# Patient Record
Sex: MALE | NOT HISPANIC OR LATINO | Employment: FULL TIME | URBAN - METROPOLITAN AREA
[De-identification: names, ages, dates, MRNs, and addresses within clinical notes are randomized per-mention and may not be internally consistent; named-entity substitution may affect disease eponyms.]

---

## 2018-05-10 ENCOUNTER — OFFICE VISIT (OUTPATIENT)
Dept: FAMILY MEDICINE | Facility: CLINIC | Age: 33
End: 2018-05-10
Payer: COMMERCIAL

## 2018-05-10 VITALS
SYSTOLIC BLOOD PRESSURE: 120 MMHG | TEMPERATURE: 98.2 F | HEART RATE: 72 BPM | WEIGHT: 181 LBS | DIASTOLIC BLOOD PRESSURE: 74 MMHG | HEIGHT: 68 IN | BODY MASS INDEX: 27.43 KG/M2

## 2018-05-10 DIAGNOSIS — H10.13 ALLERGIC CONJUNCTIVITIS, BILATERAL: ICD-10-CM

## 2018-05-10 DIAGNOSIS — Z00.00 ROUTINE GENERAL MEDICAL EXAMINATION AT A HEALTH CARE FACILITY: Primary | ICD-10-CM

## 2018-05-10 PROCEDURE — 36415 COLL VENOUS BLD VENIPUNCTURE: CPT | Performed by: FAMILY MEDICINE

## 2018-05-10 PROCEDURE — 80061 LIPID PANEL: CPT | Performed by: FAMILY MEDICINE

## 2018-05-10 PROCEDURE — 99385 PREV VISIT NEW AGE 18-39: CPT | Performed by: FAMILY MEDICINE

## 2018-05-10 PROCEDURE — 82947 ASSAY GLUCOSE BLOOD QUANT: CPT | Performed by: FAMILY MEDICINE

## 2018-05-10 NOTE — PROGRESS NOTES
SUBJECTIVE:   CC: George Morgan is an 33 year old male who presents for preventative health visit.     Healthy Habits:    Do you get at least three servings of calcium containing foods daily (dairy, green leafy vegetables, etc.)? yes    Amount of exercise or daily activities, outside of work: not as much    Problems taking medications regularly No    Medication side effects: No    Have you had an eye exam in the past two years? no    Do you see a dentist twice per year? no    Do you have sleep apnea, excessive snoring or daytime drowsiness?no       * check cholesterol and glucose levels  * itchy eyes     Seasonal itching to the eyes with some clear drainage and mild sneezing and runny nose.  Takes zyrtec and finds that helpful.    Was told he had high cholesterol in the past and made some diet changes which seemed to help.    No other concerns were noted at this time.    [place] and thinks he had a tetanus shot at that time.    Today's PHQ-2 Score:   PHQ-2 ( 1999 Pfizer) 5/10/2018   Q1: Little interest or pleasure in doing things 0   Q2: Feeling down, depressed or hopeless 0   PHQ-2 Score 0       Abuse: Current or Past(Physical, Sexual or Emotional)- No  Do you feel safe in your environment - Yes    Social History   Substance Use Topics     Smoking status: Never Smoker     Smokeless tobacco: Never Used     Alcohol use No      If you drink alcohol do you typically have >3 drinks per day or >7 drinks per week? No                      Last PSA: No results found for: PSA    Reviewed orders with patient. Reviewed health maintenance and updated orders accordingly - Yes  There is no problem list on file for this patient.    Past Surgical History:   Procedure Laterality Date     NO HISTORY OF SURGERY         Social History   Substance Use Topics     Smoking status: Never Smoker     Smokeless tobacco: Never Used     Alcohol use No     Family History   Problem Relation Age of Onset  "    Arthritis Mother      Other - See Comments Father      pt was very young, possible complications of diabetes     DIABETES No family hx of      Hypertension No family hx of          Current Outpatient Prescriptions   Medication Sig Dispense Refill     cetirizine HCl (ZYRTEC ALLERGY) 10 MG CAPS        No Known Allergies    Reviewed and updated as needed this visit by clinical staff  Tobacco  Allergies  Meds  Problems  Med Hx  Surg Hx  Fam Hx  Soc Hx          Reviewed and updated as needed this visit by Provider  Tobacco  Allergies  Meds  Problems  Med Hx  Surg Hx  Fam Hx  Soc Hx         History reviewed. No pertinent past medical history.   Past Surgical History:   Procedure Laterality Date     NO HISTORY OF SURGERY         ROS:  C: NEGATIVE for fever, chills, change in weight  I: NEGATIVE for worrisome rashes, moles or lesions  E: NEGATIVE for vision changes or irritation  ENT: NEGATIVE for ear, mouth and throat problems  R: NEGATIVE for significant cough or SOB  CV: NEGATIVE for chest pain, palpitations or peripheral edema  GI: NEGATIVE for nausea, abdominal pain, heartburn, or change in bowel habits   male: negative for dysuria, hematuria, decreased urinary stream, erectile dysfunction, urethral discharge  M: NEGATIVE for significant arthralgias or myalgia  N: NEGATIVE for weakness, dizziness or paresthesias  E: NEGATIVE for temperature intolerance, skin/hair changes  H: NEGATIVE for bleeding problems  P: NEGATIVE for changes in mood or affect    OBJECTIVE:   /74  Pulse 72  Temp 98.2  F (36.8  C) (Oral)  Ht 5' 8\" (1.727 m)  Wt 181 lb (82.1 kg)  BMI 27.52 kg/m2  EXAM:  GENERAL: healthy, alert and no distress  EYES: Eyes grossly normal to inspection, PERRL and conjunctivae and sclerae normal  EYES: Eyes grossly normal to inspection and mild injection and cobblestoning  HENT: ear canals and TM's normal, nose and mouth without ulcers or lesions  NECK: no adenopathy, no asymmetry, masses, " "or scars and thyroid normal to palpation  RESP: lungs clear to auscultation - no rales, rhonchi or wheezes  CV: regular rate and rhythm, normal S1 S2, no S3 or S4, no murmur, click or rub, no peripheral edema and peripheral pulses strong  ABDOMEN: soft, nontender, no hepatosplenomegaly, no masses and bowel sounds normal   (male): normal male genitalia without lesions or urethral discharge, no hernia  MS: no gross musculoskeletal defects noted, no edema  SKIN: no suspicious lesions or rashes  NEURO: Normal strength and tone, mentation intact and speech normal  PSYCH: mentation appears normal, affect normal/bright    ASSESSMENT/PLAN:   1. Routine general medical examination at a health care facility  Routine health issues were reviewed for age.  Recheck 1-3 years.  - Lipid panel reflex to direct LDL Non-fasting  - Glucose    2. Allergic conjunctivitis, bilateral  Symptom treatment with otc meds and indications for follow up were reviewed.      COUNSELING:  Reviewed preventive health counseling, as reflected in patient instructions       Regular exercise       Healthy diet/nutrition       Vision screening       Hearing screening       Immunizations    Declined: TDAP due to Other thinks he has had within the last 10 years               Family planning       Safe sex practices/STD prevention    BP Screening:   Last 3 BP Readings:    BP Readings from Last 3 Encounters:   05/10/18 120/74       The following was recommended to the patient:  Re-screen BP within a year and recommended lifestyle modifications   reports that he has never smoked. He has never used smokeless tobacco.    Estimated body mass index is 27.52 kg/(m^2) as calculated from the following:    Height as of this encounter: 5' 8\" (1.727 m).    Weight as of this encounter: 181 lb (82.1 kg).   Weight management plan: Discussed healthy diet and exercise guidelines and patient will follow up in 12 months in clinic to re-evaluate.    Counseling Resources:  ATP " IV Guidelines  Pooled Cohorts Equation Calculator  FRAX Risk Assessment  ICSI Preventive Guidelines  Dietary Guidelines for Americans, 2010  USDA's MyPlate  ASA Prophylaxis  Lung CA Screening    Ra Cardenas MD  [facility]

## 2018-05-10 NOTE — PATIENT INSTRUCTIONS
Preventive Health Recommendations  Male Ages 26 - 39    Yearly exam:             See your health care provider every year in order to  o   Review health changes.   o   Discuss preventive care.    o   Review your medicines if your doctor has prescribed any.    You should be tested each year for STDs (sexually transmitted diseases), if you re at risk.     After age 35, talk to your provider about cholesterol testing. If you are at risk for heart disease, have your cholesterol tested at least every 5 years.     If you are at risk for diabetes, you should have a diabetes test (fasting glucose).  Shots: Get a flu shot each year. Get a tetanus shot every 10 years.     Nutrition:    Eat at least 5 servings of fruits and vegetables daily.     Eat whole-grain bread, whole-wheat pasta and brown rice instead of white grains and rice.     Talk to your provider about Calcium and Vitamin D.     Lifestyle    Exercise for at least 150 minutes a week (30 minutes a day, 5 days a week). This will help you control your weight and prevent disease.     Limit alcohol to one drink per day.     No smoking.     Wear sunscreen to prevent skin cancer.     See your dentist every six months for an exam and cleaning.   [facility]   Discharged by : eric  Paper scripts provided to patient : none     If you have any questions regarding your visit please contact your care team:     Team Gold                Clinic Hours Telephone Number     Dr. Angelica Lima, NP 7am-7pm  Monday - Thursday   7am-5pm  Fridays  [phone]   (Appointment scheduling available 24/7)     RN Line  [phone] option 2     Urgent Care - [place] - 11am-9pm Monday-Friday Saturday-Sunday- 9am-5pm     [place] -   5pm-9pm Monday-Friday Saturday-Sunday- 9am-5pm    [phone] - Nela Fernandez    [phone] - Pako fuentes  Price Quote for your services, please call our Consumer Price Line at [phone].     What options do I have for visits at the clinic other than the traditional office visit?     To expand how we care for you, many of our providers are utilizing electronic visits (e-visits) and telephone visits, when medically appropriate, for interactions with their patients rather than a visit in the clinic. We also offer nurse visits for many medical concerns. Just like any other service, we will bill your insurance company for this type of visit based on time spent on the phone with your provider. Not all insurance companies cover these visits. Please check with your medical insurance if this type of visit is covered. You will be responsible for any charges that are not paid by your insurance.   E-visits via [employer]: generally incur a $35.00 fee.     Telephone visits:  Time spent on the phone: *charged based on time that is spent on the phone in increments of 10 minutes. Estimated cost:   5-10 mins $30.00   11-20 mins. $59.00   21-30 mins. $85.00       Use [employer] (secure email communication and access to your chart) to send your primary care provider a message or make an appointment. Ask someone on your Team how to sign up for [employer].     As always, Thank you for trusting us with your health care needs!      [facility]:    Scheduling Appointments  [place] [place]  Call: [phone]    [facility]  Call: [phone]    [facility]  Call: [phone]    For Gastroenterology referrals   [facility]   Clinics and Surgery Center, 4th Floor   [address] [place] [address]   Appointments: [phone]    WHERE TO GO FOR CARE?  Clinic    Make an appointment if you:       Are sick (cold, cough, flu, sore throat, earache or in pain).       Have a small injury (sprain, small cut, burn or broken bone).       Need a  physical exam, Pap smear, vaccine or prescription refill.       Have questions about your health or medicines.    To reach us:      Call [phone] ([phone]). Open 24 hours every day. (For counseling services, call [phone].)    Log into [employer] at [email or web address]. (Visit [email or web address] to create an account.) Hospital emergency room    An emergency is a serious or life- threatening problem that must be treated right away.    Call 911 or get to the hospital if you have:      Very bad or sudden:            - Chest pain or pressure         - Bleeding         - Head or belly pain         - Dizziness or trouble seeing, walking or                          Speaking      Problems breathing      Blood in your vomit or you are coughing up blood      A major injury (knocked out, loss of a finger or limb, rape, broken bone protruding from skin)    A mental health crisis. (Or call the Mental Health Crisis line at 1-881.358.3305 or Suicide Prevention Hotline at 1-576.563.1211.)    Open 24 hours every day. You don't need an appointment.     Urgent care    Visit urgent care for sickness or small injuries when the clinic is closed. You don't need an appointment. To check hours or find an urgent care near you, visit [email or web address]. Online care    Get online care from [facility] for more than 70 common problems, like colds, allergies and infections. Open 24 hours every day at:   [email or web address]   Need help deciding?    For advice about where to be seen, you may call your clinic and ask to speak with a nurse. We're here for you 24 hours every day.         If you are deaf or hard of hearing, please let us know. We provide many free services including sign language interpreters, oral interpreters, TTYs, telephone amplifiers, note takers and written materials.

## 2018-05-10 NOTE — MR AVS SNAPSHOT
After Visit Summary   5/10/2018    George Morgan    MRN: [id number]           Patient Information     Date Of Birth          1985        Visit Information        Provider Department      5/10/2018 6:20 PM Ra Cardenas MD [facility]        Today's Diagnoses     Routine general medical examination at a health care facility    -  1    Need for prophylactic vaccination with tetanus-diphtheria (TD)          Care Instructions      Preventive Health Recommendations  Male Ages 26 - 39    Yearly exam:             See your health care provider every year in order to  o   Review health changes.   o   Discuss preventive care.    o   Review your medicines if your doctor has prescribed any.    You should be tested each year for STDs (sexually transmitted diseases), if you re at risk.     After age 35, talk to your provider about cholesterol testing. If you are at risk for heart disease, have your cholesterol tested at least every 5 years.     If you are at risk for diabetes, you should have a diabetes test (fasting glucose).  Shots: Get a flu shot each year. Get a tetanus shot every 10 years.     Nutrition:    Eat at least 5 servings of fruits and vegetables daily.     Eat whole-grain bread, whole-wheat pasta and brown rice instead of white grains and rice.     Talk to your provider about Calcium and Vitamin D.     Lifestyle    Exercise for at least 150 minutes a week (30 minutes a day, 5 days a week). This will help you control your weight and prevent disease.     Limit alcohol to one drink per day.     No smoking.     Wear sunscreen to prevent skin cancer.     See your dentist every six months for an exam and cleaning.   [facility]   Discharged by : eric  Paper scripts provided to patient : none     If you have any questions regarding your visit please contact your care team:     Team Gold                Clinic Hours Telephone Number     Dr. Dominguez  Uri Lima, NP 7am-7pm  Monday - Thursday   7am-5pm  Fridays  [phone]   (Appointment scheduling available 24/7)     RN Line  [phone] option 2     Urgent Care - [place] - 11am-9pm Monday-Friday Saturday-Sunday- 9am-5pm     [place] -   5pm-9pm Monday-Friday Saturday-Sunday- 9am-5pm    [phone] - [place]    [phone] - [place]       For a Price Quote for your services, please call our Consumer Price Line at [phone].     What options do I have for visits at the clinic other than the traditional office visit?     To expand how we care for you, many of our providers are utilizing electronic visits (e-visits) and telephone visits, when medically appropriate, for interactions with their patients rather than a visit in the clinic. We also offer nurse visits for many medical concerns. Just like any other service, we will bill your insurance company for this type of visit based on time spent on the phone with your provider. Not all insurance companies cover these visits. Please check with your medical insurance if this type of visit is covered. You will be responsible for any charges that are not paid by your insurance.   E-visits via [employer]: generally incur a $35.00 fee.     Telephone visits:  Time spent on the phone: *charged based on time that is spent on the phone in increments of 10 minutes. Estimated cost:   5-10 mins $30.00   11-20 mins. $59.00   21-30 mins. $85.00       Use [employer] (secure email communication and access to your chart) to send your primary care provider a message or make an appointment. Ask someone on your Team how to sign up for [employer].     As always, Thank you for trusting us with your health care needs!      [facility]:    Scheduling Appointments  [facility]  Call: [phone]    [facility]  Call: [phone]    [facility]  Call: [phone]    For Gastroenterology referrals   [facility]enterology   Clinics and Surgery Center, 4th Floor   [address] [place] [address]   Appointments: [phone]    WHERE TO GO FOR CARE?  Clinic    Make an appointment if you:       Are sick (cold, cough, flu, sore throat, earache or in pain).       Have a small injury (sprain, small cut, burn or broken bone).       Need a physical exam, Pap smear, vaccine or prescription refill.       Have questions about your health or medicines.    To reach us:      Call [phone] ([phone]). Open 24 hours every day. (For counseling services, call [phone].)    Log into [employer] at RightAnswers. (Visit [employer] to create an account.) Hospital emergency room    An emergency is a serious or life- threatening problem that must be treated right away.    Call [phone] or get to the hospital if you have:      Very bad or sudden:            - Chest pain or pressure         - Bleeding         - Head or belly pain         - Dizziness or trouble seeing, walking or                          Speaking      Problems breathing      Blood in your vomit or you are coughing up blood      A major injury (knocked out, loss of a finger or limb, rape, broken bone protruding from skin)    A mental health crisis. (Or call the Mental Health Crisis line at 1-748.584.5255 or Suicide Prevention Hotline at 1-216.199.4152.)    Open 24 hours every day. You don't need an appointment.     Urgent care    Visit urgent care for sickness or small injuries when the clinic is closed. You don't need an appointment. To check hours or find an urgent care near you, visit [email or web address]. Online care    Get online care from [facility] for more than 70 common problems, like colds, allergies and infections. Open 24 hours every day at:   [email or web address]   Need help deciding?    For advice about  "where to be seen, you may call your clinic and ask to speak with a nurse. We're here for you 24 hours every day.         If you are deaf or hard of hearing, please let us know. We provide many free services including sign language interpreters, oral interpreters, TTYs, telephone amplifiers, note takers and written materials.                   Follow-ups after your visit        Follow-up notes from your care team     Return in about 1 year (around 5/10/2019) for Physical Exam.      Who to contact     If you have questions or need follow up information about today's clinic visit or your schedule please contact [facility] directly at [phone].  Normal or non-critical lab and imaging results will be communicated to you by MyChart, letter or phone within 4 business days after the clinic has received the results. If you do not hear from us within 7 days, please contact the clinic through MyChart or phone. If you have a critical or abnormal lab result, we will notify you by phone as soon as possible.  Submit refill requests through [employer] or call your pharmacy and they will forward the refill request to us. Please allow 3 business days for your refill to be completed.          Additional Information About Your Visit        MyChart Information     [employer] lets you send messages to your doctor, view your test results, renew your prescriptions, schedule appointments and more. To sign up, go to [email or web address] . Click on \"Log in\" on the left side of the screen, which will take you to the Welcome page. Then click on \"Sign up Now\" on the right side of the page.     You will be asked to enter the access code listed below, as well as some personal information. Please follow the directions to create your username and password.     Your access code is: [email or web address]  Expires: 2018  6:55 PM     Your access code will  in 90 days. If you need help or a new code, please call your [place] clinic " "or [phone].        Care EveryWhere ID     This is your Care EveryWhere ID. This could be used by other organizations to access your [place] medical records  [id number]        Your Vitals Were     Pulse Temperature Height BMI (Body Mass Index)          72 98.2  F (36.8  C) (Oral) 5' 8\" (1.727 m) 27.52 kg/m2         Blood Pressure from Last 3 Encounters:   05/10/18 120/74    Weight from Last 3 Encounters:   05/10/18 181 lb (82.1 kg)              We Performed the Following     Glucose     Lipid panel reflex to direct LDL Non-fasting        Primary Care Provider Office Phone # Fax #    [facility] [phone]       [address] [facility] [address]        Equal Access to Services     ISI SEGAL : Hadii aad ku hadasho Soomaali, waaxda luqadaha, qaybta kaalmada adeegyada, sofía so hayperla thomason . So [facility] [phone].    ATENCIÓN: Si habla español, tiene a snider disposición servicios gratuitos de asistencia lingüística. Llame al [phone].    We comply with applicable federal civil rights laws and [place] laws. We do not discriminate on the basis of race, color, national origin, age, disability, sex, sexual orientation, or gender identity.            Thank you!     Thank you for choosing [facility]  for your care. Our goal is always to provide you with excellent care. Hearing back from our patients is one way we can continue to improve our services. Please take a few minutes to complete the written survey that you may receive in the mail after your visit with us. Thank you!             Your Updated Medication List - Protect others around you: Learn how to safely use, store and throw away your medicines at www.disposemymeds.org.          This list is accurate as of 5/10/18  6:55 PM.  Always use your most recent med list.                   Brand Name Dispense Instructions for use Diagnosis    ZYRTEC ALLERGY 10 MG Caps   Generic drug:  " cetirizine HCl

## 2018-05-10 NOTE — LETTER
[facility]  [address] [place] [id number] [phone]                                                                                                May 14, 2018    George Morgan  [address]    [place] [address]        Dear Mr. Morgan,      Overall tests look OK.  The mild elevation in the triglyceride level is likely due to being not fasting at the time of the labs.  No medication is needed at this time and we can recheck it next year.  Screening for diabetes was normal.    Results for orders placed or performed in visit on 05/10/18   Lipid panel reflex to direct LDL Non-fasting   Result Value Ref Range    Cholesterol 208 (H) <200 mg/dL    Triglycerides 292 (H) <150 mg/dL    HDL Cholesterol 37 (L) >39 mg/dL    LDL Cholesterol Calculated 113 (H) <100 mg/dL    Non HDL Cholesterol 171 (H) <130 mg/dL   Glucose   Result Value Ref Range    Glucose 79 70 - 99 mg/dL         Sincerely,      Ra Cardenas MD/mv

## 2018-05-11 LAB
CHOLEST SERPL-MCNC: 208 MG/DL
GLUCOSE SERPL-MCNC: 79 MG/DL (ref 70–99)
HDLC SERPL-MCNC: 37 MG/DL
LDLC SERPL CALC-MCNC: 113 MG/DL
NONHDLC SERPL-MCNC: 171 MG/DL
TRIGL SERPL-MCNC: 292 MG/DL

## 2019-12-31 ENCOUNTER — OFFICE VISIT (OUTPATIENT)
Dept: FAMILY MEDICINE | Facility: CLINIC | Age: 34
End: 2019-12-31
Payer: COMMERCIAL

## 2019-12-31 VITALS
WEIGHT: 184 LBS | HEIGHT: 69 IN | OXYGEN SATURATION: 98 % | SYSTOLIC BLOOD PRESSURE: 111 MMHG | DIASTOLIC BLOOD PRESSURE: 69 MMHG | HEART RATE: 84 BPM | BODY MASS INDEX: 27.25 KG/M2

## 2019-12-31 DIAGNOSIS — R07.82 INTERCOSTAL PAIN: ICD-10-CM

## 2019-12-31 DIAGNOSIS — Z00.00 ROUTINE GENERAL MEDICAL EXAMINATION AT A HEALTH CARE FACILITY: Primary | ICD-10-CM

## 2019-12-31 PROCEDURE — 36415 COLL VENOUS BLD VENIPUNCTURE: CPT | Performed by: FAMILY MEDICINE

## 2019-12-31 PROCEDURE — 82947 ASSAY GLUCOSE BLOOD QUANT: CPT | Performed by: FAMILY MEDICINE

## 2019-12-31 PROCEDURE — 80061 LIPID PANEL: CPT | Performed by: FAMILY MEDICINE

## 2019-12-31 PROCEDURE — 99395 PREV VISIT EST AGE 18-39: CPT | Performed by: FAMILY MEDICINE

## 2019-12-31 ASSESSMENT — ENCOUNTER SYMPTOMS
ABDOMINAL PAIN: 0
ARTHRALGIAS: 0
PARESTHESIAS: 0
HEMATOCHEZIA: 0
CONSTIPATION: 0
NERVOUS/ANXIOUS: 0
HEADACHES: 0
FEVER: 0
CHILLS: 0
NAUSEA: 0
DYSURIA: 0
PALPITATIONS: 0
DIZZINESS: 0
EYE PAIN: 0
MYALGIAS: 0
WEAKNESS: 0
SHORTNESS OF BREATH: 0
HEMATURIA: 0
DIARRHEA: 0
FREQUENCY: 0
COUGH: 1
JOINT SWELLING: 0
SORE THROAT: 0
HEARTBURN: 0

## 2019-12-31 ASSESSMENT — MIFFLIN-ST. JEOR: SCORE: 1770

## 2019-12-31 NOTE — LETTER
[facility]   [address] [place]  [address]  [phone]                                   January 3, 2020    George Ismail  [address] [place] [address]        Dear George,    Blood sugar and cholesterol levels are all stable compared to the last check.  Let me know if you have any questions.    Results for orders placed or performed in visit on 12/31/19   Lipid panel reflex to direct LDL Non-fasting     Status: Abnormal   Result Value Ref Range    Cholesterol 203 (H) <200 mg/dL    Triglycerides 204 (H) <150 mg/dL    HDL Cholesterol 38 (L) >39 mg/dL    LDL Cholesterol Calculated 124 (H) <100 mg/dL    Non HDL Cholesterol 165 (H) <130 mg/dL   Glucose     Status: None   Result Value Ref Range    Glucose 84 70 - 99 mg/dL       If you have any questions please call the clinic at [phone]    Sincerely,    Ra Cardenas MD  bmd

## 2019-12-31 NOTE — PROGRESS NOTES
SUBJECTIVE:   CC: George Morgan is an 34 year old male who presents for preventative health visit.     Healthy Habits:     Getting at least 3 servings of Calcium per day:  Yes    Bi-annual eye exam:  NO    Dental care twice a year:  NO    Sleep apnea or symptoms of sleep apnea:  None    Diet:  Other    Frequency of exercise:  1 day/week    Duration of exercise:  N/A    Taking medications regularly:  Yes    Medication side effects:  None    PHQ-2 Total Score: 0    Additional concerns today:  No      The only concern the patient brings up is some mild chest discomfort.  He demonstrates this by taking his right hand and rubbing it underneath the left breast at the intercostal joint.  The pain is mostly reproduced with pushing on the area.  It can also be aggravated by deep breathing and some physical activity.  There are no other associated symptoms such as radiation, shortness of breath, hemoptysis, diaphoresis, nausea, or vomiting.  This been present for about 3 weeks on an intermittent basis.  He has not taken any medication for it.    Today's PHQ-2 Score:   PHQ-2 ( 1999 [employer]) 12/31/2019   Q1: Little interest or pleasure in doing things 0   Q2: Feeling down, depressed or hopeless 0   PHQ-2 Score 0   Q1: Little interest or pleasure in doing things Not at all   Q2: Feeling down, depressed or hopeless Not at all   PHQ-2 Score 0       Abuse: Current or Past(Physical, Sexual or Emotional)- NOT APPLICABLE  Do you feel safe in your environment? NOT APPLICABLE        Social History     Tobacco Use     Smoking status: Never Smoker     Smokeless tobacco: Never Used   Substance Use Topics     Alcohol use: No     If you drink alcohol do you typically have >3 drinks per day or >7 drinks per week? No    Alcohol Use 12/31/2019   Prescreen: >3 drinks/day or >7 drinks/week? Not Applicable   Prescreen: >3 drinks/day or >7 drinks/week? -   No flowsheet data found.    Last PSA: No results found for: PSA    Reviewed orders with  patient. Reviewed health maintenance and updated orders accordingly - Yes  There is no problem list on file for this patient.    Past Surgical History:   Procedure Laterality Date     NO HISTORY OF SURGERY         Social History     Tobacco Use     Smoking status: Never Smoker     Smokeless tobacco: Never Used   Substance Use Topics     Alcohol use: No     Family History   Problem Relation Age of Onset     Arthritis Mother      Other - See Comments Father         pt was very young, possible complications of diabetes     Diabetes No family hx of      Hypertension No family hx of          Current Outpatient Medications   Medication Sig Dispense Refill     cetirizine HCl (ZYRTEC ALLERGY) 10 MG CAPS        No Known Allergies    Reviewed and updated as needed this visit by clinical staff  Tobacco  Allergies  Meds  Problems  Med Hx  Surg Hx  Fam Hx  Soc Hx          Reviewed and updated as needed this visit by Provider  Tobacco  Allergies  Meds  Problems  Med Hx  Surg Hx  Fam Hx  Soc Hx             Review of Systems   Constitutional: Negative for chills and fever.   HENT: Negative for congestion, ear pain, hearing loss and sore throat.    Eyes: Negative for pain and visual disturbance.   Respiratory: Positive for cough. Negative for shortness of breath.    Cardiovascular: Positive for chest pain. Negative for palpitations and peripheral edema.   Gastrointestinal: Negative for abdominal pain, constipation, diarrhea, heartburn, hematochezia and nausea.   Genitourinary: Negative for discharge, dysuria, frequency, genital sores, hematuria, impotence and urgency.   Musculoskeletal: Negative for arthralgias, joint swelling and myalgias.   Skin: Negative for rash.   Neurological: Negative for dizziness, weakness, headaches and paresthesias.   Psychiatric/Behavioral: Negative for mood changes. The patient is not nervous/anxious.          OBJECTIVE:   /69 (BP Location: Left arm, Patient Position: Chair, Cuff  "Size: Adult Regular)   Pulse 84   Ht 1.753 m (5' 9\")   Wt 83.5 kg (184 lb)   SpO2 98%   BMI 27.17 kg/m      Physical Exam  GENERAL: healthy, alert and no distress  EYES: Eyes grossly normal to inspection, PERRL and conjunctivae and sclerae normal  HENT: ear canals and TM's normal, nose and mouth without ulcers or lesions  NECK: no adenopathy, no asymmetry, masses, or scars and thyroid normal to palpation  RESP: lungs clear to auscultation - no rales, rhonchi or wheezes  RESP: I can reproduce the discomfort with palpation in the nipple line just below the edge of the breast over the ribs.  There is no palpable abnormality in this area.  BREAST: normal without masses, tenderness or nipple discharge and no palpable axillary masses or adenopathy  CV: regular rate and rhythm, normal S1 S2, no S3 or S4, no murmur, click or rub, no peripheral edema and peripheral pulses strong  ABDOMEN: soft, nontender, no hepatosplenomegaly, no masses and bowel sounds normal   (male): normal male genitalia without lesions or urethral discharge, no hernia  MS: no gross musculoskeletal defects noted, no edema  SKIN: no suspicious lesions or rashes  NEURO: Normal strength and tone, mentation intact and speech normal  PSYCH: mentation appears normal, affect normal/bright    Diagnostic Test Results:  Labs reviewed in Epic    ASSESSMENT/PLAN:   1. Routine general medical examination at a health care facility  Routine wellness issues appropriate for age were reviewed today.  Follow-up is recommended in 1 year for routine physical.  Laboratory studies were done as noted.  - Lipid panel reflex to direct LDL Non-fasting  - Glucose    2. Intercostal pain  This is most consistent with musculoskeletal cause of the chest pain given that we can reproduce it on physical examination today.  Reassured regarding the benign nature of this pain, treatment options, and expected course for improvement.  Indications for follow-up including worsening " "symptoms were reviewed.      COUNSELING:   Reviewed preventive health counseling, as reflected in patient instructions       Regular exercise       Healthy diet/nutrition       Vision screening       Hearing screening    Estimated body mass index is 27.17 kg/m  as calculated from the following:    Height as of this encounter: 1.753 m (5' 9\").    Weight as of this encounter: 83.5 kg (184 lb).          reports that he has never smoked. He has never used smokeless tobacco.      Counseling Resources:  ATP IV Guidelines  Pooled Cohorts Equation Calculator  FRAX Risk Assessment  ICSI Preventive Guidelines  Dietary Guidelines for Americans, 2010  USDA's MyPlate  ASA Prophylaxis  Lung CA Screening    Ra Cardenas MD  [facility]  "

## 2020-01-02 LAB
CHOLEST SERPL-MCNC: 203 MG/DL
GLUCOSE SERPL-MCNC: 84 MG/DL (ref 70–99)
HDLC SERPL-MCNC: 38 MG/DL
LDLC SERPL CALC-MCNC: 124 MG/DL
NONHDLC SERPL-MCNC: 165 MG/DL
TRIGL SERPL-MCNC: 204 MG/DL

## 2020-04-02 ENCOUNTER — TELEPHONE (OUTPATIENT)
Dept: FAMILY MEDICINE | Facility: CLINIC | Age: 35
End: 2020-04-02

## 2020-04-02 ENCOUNTER — VIRTUAL VISIT (OUTPATIENT)
Dept: FAMILY MEDICINE | Facility: CLINIC | Age: 35
End: 2020-04-02
Payer: COMMERCIAL

## 2020-04-02 DIAGNOSIS — L24.9 IRRITANT CONTACT DERMATITIS, UNSPECIFIED TRIGGER: Primary | ICD-10-CM

## 2020-04-02 PROCEDURE — 99213 OFFICE O/P EST LOW 20 MIN: CPT | Mod: TEL | Performed by: FAMILY MEDICINE

## 2020-04-02 RX ORDER — CLOBETASOL PROPIONATE 0.5 MG/G
CREAM TOPICAL
Qty: 60 G | Refills: 0 | Status: SHIPPED | OUTPATIENT
Start: 2020-04-02 | End: 2021-04-29

## 2020-04-02 NOTE — TELEPHONE ENCOUNTER
Reason for Call:  Other     Detailed comments: Patient said that he has a fungal infection on the left side of his back and would like to discuss further. [facility] pharmacy     Phone Number Patient can be reached at: Home number on file [phone] (home)    Best Time:     Can we leave a detailed message on this number? YES    Call taken on 4/2/2020 at 12:45 PM by Viviane Ramirez

## 2020-04-02 NOTE — PROGRESS NOTES
"Subjective     George Morgan is a 35 year old male who is being evaluated via a billable telephone visit.      The patient has been notified of following:     \"This telephone visit will be conducted via a call between you and your physician/provider. We have found that certain health care needs can be provided without the need for a physical exam.  This service lets us provide the care you need with a short phone conversation.  If a prescription is necessary we can send it directly to your pharmacy.  If lab work is needed we can place an order for that and you can then stop by our lab to have the test done at a later time.    If during the course of the call the physician/provider feels a telephone visit is not appropriate, you will not be charged for this service.\"     Patient has given verbal consent for Telephone visit?  Yes    George Morgan complains of   Chief Complaint   Patient presents with     Derm Problem     back     Patient reports for the past 2 weeks he has been having dry, scaling rash , itchy to his right lower back area he denies any current contact with anything different.  He has no other symptoms.  Denies fever, denies recent sickness.  Has no cough.    He reports he tried antifungal cream, over-the-counter and is not helping.  He denies new clothes, detergent.  Patient did send a picture of his rash.  ALLERGIES  Patient has no known allergies.    Rash  Onset: over a week ago    Description:   Location: Back  Character: round, painful, red  Itching (Pruritis): YES    Progression of Symptoms:  worsening and constant    Accompanying Signs & Symptoms:  Fever: no   Body aches or joint pain: YES  Sore throat symptoms: no   Recent cold symptoms: no     History:   Previous similar rash: YES    Precipitating factors:   Exposure to similar rash: YES  New exposures: lotions Herbolene. Currently using: Cetaphil   Recent travel: no     Alleviating factors:  none    Therapies Tried and outcome: anti-fungi " cream; not working      There is no problem list on file for this patient.    Past Surgical History:   Procedure Laterality Date     NO HISTORY OF SURGERY         Social History     Tobacco Use     Smoking status: Never Smoker     Smokeless tobacco: Never Used   Substance Use Topics     Alcohol use: No     Family History   Problem Relation Age of Onset     Arthritis Mother      Other - See Comments Father         pt was very young, possible complications of diabetes     Diabetes No family hx of      Hypertension No family hx of          Current Outpatient Medications   Medication Sig Dispense Refill     cetirizine HCl (ZYRTEC ALLERGY) 10 MG CAPS        clobetasol (TEMOVATE) 0.05 % external cream Apply to area bid for 5- 7 days, then bid as needed 60 g 0     No Known Allergies    Reviewed and updated as needed this visit by Provider         Review of Systems   ROS COMP: Constitutional, HEENT, cardiovascular, pulmonary, gi and gu systems are negative, except as otherwise noted.       Objective   Reported vitals:  There were no vitals taken for this visit.   healthy, alert and no distress  Psych: Alert and oriented times 3; coherent speech, normal   rate and volume, able to articulate logical thoughts, able   to abstract reason, no tangential thoughts, no hallucinations   or delusions  His affect is normal.     Diagnostic Test Results:  Labs reviewed in Epic  none         Assessment/Plan:  1. Irritant contact dermatitis, unspecified trigger  Based on the picture he has sent, it does look more dermatitis, contact dermatitis.  He was advised with good hygiene.  He was advised to avoid scratching it.  Was advised to use topical steroid cream, has been prescribed.    - clobetasol (TEMOVATE) 0.05 % external cream; Apply to area bid for 5- 7 days, then bid as needed  Dispense: 60 g; Refill: 0    No follow-ups on file.      Phone call duration:  10 minutes    Daisy Haley MD

## 2020-04-02 NOTE — TELEPHONE ENCOUNTER
Patient has not seen Dr Carcamo, he saw Dr. Cardenas 12/31/19.     I called patient, pharmacy cued.   Reports rash to his back started as a small [place] to his left mid back about 2 weeks ago, is itchy but not painful, denies fever or cold symptoms.   Says he was using anti fungal foot cream for it but is spreading more to his left ribs now.    Says the pharmacist told him to call his doctor in case it was measles.   Again, he denies other signs of illness, denies rash to front/torso.       Scheduled for phone visit with Dr. Haley to discuss.    Rocío Browning RN  [facility]

## 2020-06-26 ENCOUNTER — OFFICE VISIT (OUTPATIENT)
Dept: URGENT CARE | Facility: URGENT CARE | Age: 35
End: 2020-06-26
Payer: COMMERCIAL

## 2020-06-26 ENCOUNTER — ANCILLARY PROCEDURE (OUTPATIENT)
Dept: GENERAL RADIOLOGY | Facility: CLINIC | Age: 35
End: 2020-06-26
Attending: NURSE PRACTITIONER
Payer: COMMERCIAL

## 2020-06-26 VITALS
WEIGHT: 183.6 LBS | BODY MASS INDEX: 27.11 KG/M2 | HEART RATE: 81 BPM | SYSTOLIC BLOOD PRESSURE: 112 MMHG | OXYGEN SATURATION: 98 % | TEMPERATURE: 97.6 F | DIASTOLIC BLOOD PRESSURE: 75 MMHG | RESPIRATION RATE: 14 BRPM

## 2020-06-26 DIAGNOSIS — M54.6 ACUTE LEFT-SIDED THORACIC BACK PAIN: ICD-10-CM

## 2020-06-26 DIAGNOSIS — M54.6 ACUTE LEFT-SIDED THORACIC BACK PAIN: Primary | ICD-10-CM

## 2020-06-26 PROCEDURE — 72080 X-RAY EXAM THORACOLMB 2/> VW: CPT

## 2020-06-26 PROCEDURE — 99214 OFFICE O/P EST MOD 30 MIN: CPT | Performed by: NURSE PRACTITIONER

## 2020-06-26 RX ORDER — CYCLOBENZAPRINE HCL 10 MG
10 TABLET ORAL 3 TIMES DAILY PRN
Qty: 21 TABLET | Refills: 0 | Status: SHIPPED | OUTPATIENT
Start: 2020-06-26 | End: 2020-07-03

## 2020-06-26 ASSESSMENT — PAIN SCALES - GENERAL: PAINLEVEL: MODERATE PAIN (4)

## 2020-06-26 NOTE — PATIENT INSTRUCTIONS
Patient Education     Back Spasm (No Trauma)    Spasm of the back muscles can occur after a sudden forceful twisting or bending such as in a car accident. A spasm can also happen after a simple awkward movement, or after lifting something heavy with poor body positioning. In any case, muscle spasm adds to the pain. Sleeping in an awkward position or on a poor quality mattress can also cause this. Some people respond to emotional stress by tensing the muscles of their back.  Pain that continues may need further assessment or other types of treatment such as physical therapy.  You don't always need X-rays for the first assessment of back pain, unless you had a physical injury such as from a car accident or fall. If your pain continues and doesn't respond to medical treatment, X-rays and other tests may then be done.   Home care    As soon as possible, start sitting or walking again. This will help prevent problems from a long bed rest. These problems include muscle weakness, worsening back stiffness and pain, and blood clots in the legs.    When in bed, try to find a position of comfort. A firm mattress is best. Try lying flat on your back with pillows under your knees. You can also try lying on your side with your knees bent up toward your chest and a pillow between your knees.    Don't sit for long periods. Also limit car rides and travel. This puts more stress on the lower back than standing or walking.     During the first 24 to 72 hours after an injury or flare-up, put an ice pack on the painful area for 20 minutes, then remove it for 20 minutes. Do this over a period of 60 to 90 minutes, or several times a day. This will reduce swelling and pain. Always wrap ice packs in a thin towel.    You can start with ice, then switch to heat. Heat from a hot shower, hot bath, or heating pad reduces pain and works well for muscle spasms. Put heat on the painful area for 20 minutes, then remove it for 20 minutes. Do this  over a period of 60 to 90 minutes, or several times a day. Don't sleep on a heating pad. It can burn or damage skin.    Alternate using ice and heat.    Be aware of safe lifting methods. don't lift anything over 15 pounds until all the pain is gone.  Gentle stretching will help your back heal faster. Do this simple routine 2 to 3 times a day until your back is feeling better.    Lie on your back with your knees bent and both feet on the ground.    Slowly raise your left knee to your chest as you flatten your lower back against the floor. Hold for 20 to 30 seconds.    Relax and repeat the exercise with your right knee.    Do 2 to 3 of these exercises for each leg.    Repeat, hugging both knees to your chest at the same time.    Don't bounce, but use a gentle pull.  Medicines  Talk with your doctor before using medicine, especially if you have other medical problems or are taking other medicines.  You may use over-the-counter medicines such as acetaminophen, ibuprofen, or naprosyn to control pain, unless your healthcare provider prescribed another pain medicine. Talk with your healthcare provider if you have a chronic condition such as diabetes, liver or kidney disease, stomach ulcer, or digestive bleeding, or are taking blood thinners.  Be careful if you are given prescription pain medicine, opioids, or medicine for muscle spasm. They can cause drowsiness, and affect your coordination, reflexes, and judgment. Don't drive or operate heavy machinery when taking these medicines. Take pain medicine only as prescribed by your healthcare provider.  Follow-up care  Follow up with your doctor, or as advised. You may need physical therapy or more tests.  If X-rays were taken, they may be reviewed by a radiologist. You will be told of any new findings that may affect your care.  Call   Call if any of these occur:    Trouble breathing    Confusion    Drowsiness or trouble awakening    Fainting or loss of consciousness    Rapid  or very slow heart rate    Loss of bowel or bladder control  When to seek medical advice  Call your healthcare provider right away if any of these occur:    Pain becomes worse or spreads to your legs    Weakness or numbness in one or both legs    Numbness in the groin or genital area    Fever of 100.4 F (38 C) or higher , or as directed by your healthcare provider    Chills    Burning or pain when passing urine  Date Last Reviewed: 11/1/2018 2000-2019 [employer]. [address] [place], [place] [address]. All rights reserved. This information is not intended as a substitute for professional medical care. Always follow your healthcare professional's instructions.

## 2020-06-26 NOTE — PROGRESS NOTES
SUBJECTIVE  HPI: George Morgan is a 35 year old male who presents for evaluation of back pain  Symptoms began 2 week(s) ago, have been frequency intermittant and are worse.  Pain is located in the upper left region, with radiation to does not radiate, and are at worst a 5 on a scale of 1-10.  Recent injury:none recalled by the patient  Personal hx of back pain is no prior back problems.  Pain is exacerbated by: bending and changing position.  Pain is relieved by: none  Red flag symptoms: negative.    History reviewed. No pertinent past medical history.  Current Outpatient Medications   Medication Sig Dispense Refill     cetirizine HCl (ZYRTEC ALLERGY) 10 MG CAPS        clobetasol (TEMOVATE) 0.05 % external cream Apply to area bid for 5- 7 days, then bid as needed (Patient not taking: Reported on 6/26/2020) 60 g 0     Social History     Tobacco Use     Smoking status: Never Smoker     Smokeless tobacco: Never Used   Substance Use Topics     Alcohol use: No       ROS:  Review of systems negative except as stated above.    OBJECTIVE:  /75   Pulse 81   Temp 97.6  F (36.4  C) (Tympanic)   Resp 14   Wt 83.3 kg (183 lb 9.6 oz)   SpO2 98%   BMI 27.11 kg/m    Back examination: Back symmetric, no curvature. ROM normal. No CVA tenderness.  Tenderness upper left to palpation  STRAIGHT leg raise test: negative  GENERAL APPEARANCE: healthy, alert and no distress  RESP: lungs clear to auscultation - no rales, rhonchi or wheezes  CV: regular rates and rhythm, normal S1 S2, no murmur noted  ABDOMEN:  soft, nontender, no HSM or masses and bowel sounds normal  NEURO: Normal strength and tone with no weakness or sensory deficit noted, reflexes normal   SKIN: no suspicious lesions or rashes    ASSESSMENT/IMPRESSION:  (M54.6) Acute left-sided thoracic back pain  (primary encounter diagnosis)    XRAY: no abnormalities noted, pending radiology read    PLAN:1)Flexeril 10 mg PRN        1.  Continue stretching and strengthening  exercises.       2.  Continue prn heat or ice application.    Home treat and monitor symptoms call or rtc if new or worsening    MINDI Lomeli CNP

## 2021-03-22 ENCOUNTER — OFFICE VISIT (OUTPATIENT)
Dept: FAMILY MEDICINE | Facility: CLINIC | Age: 36
End: 2021-03-22
Payer: COMMERCIAL

## 2021-03-22 VITALS
DIASTOLIC BLOOD PRESSURE: 84 MMHG | HEIGHT: 69 IN | OXYGEN SATURATION: 100 % | TEMPERATURE: 97.7 F | WEIGHT: 190 LBS | BODY MASS INDEX: 28.14 KG/M2 | HEART RATE: 80 BPM | SYSTOLIC BLOOD PRESSURE: 132 MMHG

## 2021-03-22 DIAGNOSIS — Z11.4 ENCOUNTER FOR SCREENING FOR HIV: ICD-10-CM

## 2021-03-22 DIAGNOSIS — Z11.59 NEED FOR HEPATITIS C SCREENING TEST: ICD-10-CM

## 2021-03-22 DIAGNOSIS — K64.4 EXTERNAL HEMORRHOIDS: ICD-10-CM

## 2021-03-22 DIAGNOSIS — L20.82 FLEXURAL ECZEMA: ICD-10-CM

## 2021-03-22 DIAGNOSIS — Z00.00 ROUTINE GENERAL MEDICAL EXAMINATION AT A HEALTH CARE FACILITY: Primary | ICD-10-CM

## 2021-03-22 DIAGNOSIS — Z86.16 HISTORY OF 2019 NOVEL CORONAVIRUS DISEASE (COVID-19): ICD-10-CM

## 2021-03-22 LAB
CHOLEST SERPL-MCNC: 200 MG/DL
GLUCOSE SERPL-MCNC: 88 MG/DL (ref 70–99)
HCV AB SERPL QL IA: NONREACTIVE
HDLC SERPL-MCNC: 53 MG/DL
HIV 1+2 AB+HIV1 P24 AG SERPL QL IA: NONREACTIVE
LDLC SERPL CALC-MCNC: 123 MG/DL
NONHDLC SERPL-MCNC: 147 MG/DL
TRIGL SERPL-MCNC: 118 MG/DL

## 2021-03-22 PROCEDURE — 86803 HEPATITIS C AB TEST: CPT | Performed by: FAMILY MEDICINE

## 2021-03-22 PROCEDURE — 99395 PREV VISIT EST AGE 18-39: CPT | Performed by: FAMILY MEDICINE

## 2021-03-22 PROCEDURE — 80061 LIPID PANEL: CPT | Performed by: FAMILY MEDICINE

## 2021-03-22 PROCEDURE — 87389 HIV-1 AG W/HIV-1&-2 AB AG IA: CPT | Performed by: FAMILY MEDICINE

## 2021-03-22 PROCEDURE — 36415 COLL VENOUS BLD VENIPUNCTURE: CPT | Performed by: FAMILY MEDICINE

## 2021-03-22 PROCEDURE — 82947 ASSAY GLUCOSE BLOOD QUANT: CPT | Performed by: FAMILY MEDICINE

## 2021-03-22 PROCEDURE — 86769 SARS-COV-2 COVID-19 ANTIBODY: CPT | Performed by: FAMILY MEDICINE

## 2021-03-22 ASSESSMENT — MIFFLIN-ST. JEOR: SCORE: 1779.33

## 2021-03-22 NOTE — PROGRESS NOTES
3  SUBJECTIVE:   CC: George Morgan is an 36 year old male who presents for preventive health visit.       Patient has been advised of split billing requirements and indicates understanding: Yes  Healthy Habits:    Do you get at least three servings of calcium containing foods daily (dairy, green leafy vegetables, etc.)? yes    Amount of exercise or daily activities, outside of work: 2-3 day(s) per week    Problems taking medications regularly No    Medication side effects: No    Have you had an eye exam in the past two years? no    Do you see a dentist twice per year? yes    Do you have sleep apnea, excessive snoring or daytime drowsiness? Yes      For the last month he has had a little bit of a dull headache, primarily along the crown.  Is not associated with any symptoms such as nausea, vomiting, photo or phonophobia.  Its not getting worse.  It is present most days.  Tylenol usually provide about 10 hours of relief.  He describes it as dull and aching.    He is also had a little bit of blood with passage of hard bowel movements.  The blood is mostly on the toilet paper with a little bit in the toilet.  Last occurrence was a couple weeks ago.    Today's PHQ-2 Score:   PHQ-2 ( 1999 [employer]) 3/22/2021 4/2/2020   Q1: Little interest or pleasure in doing things 0 0   Q2: Feeling down, depressed or hopeless 0 0   PHQ-2 Score 0 0   Q1: Little interest or pleasure in doing things - -   Q2: Feeling down, depressed or hopeless - -   PHQ-2 Score - -       Abuse: Current or Past(Physical, Sexual or Emotional)- No  Do you feel safe in your environment? Yes    Have you ever done Advance Care Planning? (For example, a Health Directive, POLST, or a discussion with a medical provider or your loved ones about your wishes): No, advance care planning information given to patient to review.  Patient declined advance care planning discussion at this time.    Social History     Tobacco Use     Smoking status: Never Smoker      Smokeless tobacco: Never Used   Substance Use Topics     Alcohol use: No     If you drink alcohol do you typically have >3 drinks per day or >7 drinks per week? No                      Last PSA: No results found for: PSA    Reviewed orders with patient. Reviewed health maintenance and updated orders accordingly - Yes  Patient Active Problem List   Diagnosis     Flexural eczema     Past Surgical History:   Procedure Laterality Date     NO HISTORY OF SURGERY         Social History     Tobacco Use     Smoking status: Never Smoker     Smokeless tobacco: Never Used   Substance Use Topics     Alcohol use: No     Family History   Problem Relation Age of Onset     Arthritis Mother      Other - See Comments Father         pt was very young, possible complications of diabetes     Diabetes No family hx of      Hypertension No family hx of          Current Outpatient Medications   Medication Sig Dispense Refill     cetirizine HCl (ZYRTEC ALLERGY) 10 MG CAPS        clobetasol (TEMOVATE) 0.05 % external cream Apply to area bid for 5- 7 days, then bid as needed (Patient not taking: Reported on 6/26/2020) 60 g 0     No Known Allergies    Reviewed and updated as needed this visit by clinical staff  Tobacco  Allergies  Meds   Med Hx  Surg Hx  Fam Hx  Soc Hx        Reviewed and updated as needed this visit by Provider  Tobacco  Allergies  Meds   Med Hx  Surg Hx  Fam Hx  Soc Hx           ROS:  CONSTITUTIONAL: NEGATIVE for fever, chills, change in weight  INTEGUMENTARY/SKIN: NEGATIVE for worrisome rashes, moles or lesions  EYES: NEGATIVE for vision changes or irritation  ENT: NEGATIVE for ear, mouth and throat problems  RESP: NEGATIVE for significant cough or SOB  CV: NEGATIVE for chest pain, palpitations or peripheral edema  GI: NEGATIVE for nausea, abdominal pain, heartburn, or change in bowel habits   male: negative for dysuria, hematuria, decreased urinary stream, erectile dysfunction, urethral  "discharge  MUSCULOSKELETAL: NEGATIVE for significant arthralgias or myalgia  NEURO: NEGATIVE for weakness, dizziness or paresthesias  PSYCHIATRIC: NEGATIVE for changes in mood or affect    OBJECTIVE:   /84   Pulse 80   Temp 97.7  F (36.5  C) (Temporal)   Ht 1.74 m (5' 8.5\")   Wt 86.2 kg (190 lb)   SpO2 100%   BMI 28.47 kg/m    EXAM:  GENERAL: healthy, alert and no distress  EYES: Eyes grossly normal to inspection, PERRL and conjunctivae and sclerae normal  HENT: ear canals and TM's normal, nose and mouth without ulcers or lesions  NECK: no adenopathy, no asymmetry, masses, or scars and thyroid normal to palpation  RESP: lungs clear to auscultation - no rales, rhonchi or wheezes  CV: regular rate and rhythm, normal S1 S2, no S3 or S4, no murmur, click or rub, no peripheral edema and peripheral pulses strong  ABDOMEN: soft, nontender, no hepatosplenomegaly, no masses and bowel sounds normal   (male): normal male genitalia without lesions or urethral discharge, no hernia  RECTAL: normal sphincter tone, no rectal masses and external hemorrhoid without any evidence of recent bleeding  MS: no gross musculoskeletal defects noted, no edema  SKIN: no suspicious lesions or rashes  NEURO: Normal strength and tone, mentation intact and speech normal  PSYCH: mentation appears normal, affect normal/bright    Diagnostic Test Results:  Labs reviewed in Epic    ASSESSMENT/PLAN:   1. Routine general medical examination at a health care facility  Routine health issues reviewed.  Follow-up is recommended in 1 year.  We will check glucose and lipid panel.  The headache sounds a lot like a tension headache.  Reassured patient this will likely resolve but did review indications for follow-up.  - Glucose  - Lipid panel reflex to direct LDL Fasting    2. Flexural eczema  Has some clobetasol cream that he uses as needed.    3. History of 2019 novel coronavirus disease (COVID-19)  Patient reports having Covid last November and " "was curious about antibody testing.  - COVID-19 Virus (Coronavirus) Antibody & Titer Reflex    4. Need for hepatitis C screening test  One-time screening test for hepatitis C was done.  - Hepatitis C antibody    5. Encounter for screening for HIV  One-time screening test for HIV was discussed and done.  - HIV Antigen Antibody Combo    6. External hemorrhoids  On exam the patient has some external hemorrhoids.  Treatment and preventive measures were reviewed with the patient as were indications for follow-up if the bleeding persists.      Patient has been advised of split billing requirements and indicates understanding: Yes  COUNSELING:  Reviewed preventive health counseling, as reflected in patient instructions       Regular exercise       Healthy diet/nutrition       Vision screening       Hearing screening    Estimated body mass index is 28.47 kg/m  as calculated from the following:    Height as of this encounter: 1.74 m (5' 8.5\").    Weight as of this encounter: 86.2 kg (190 lb).    Weight management plan: Discussed healthy diet and exercise guidelines    He reports that he has never smoked. He has never used smokeless tobacco.      Counseling Resources:  ATP IV Guidelines  Pooled Cohorts Equation Calculator  FRAX Risk Assessment  ICSI Preventive Guidelines  Dietary Guidelines for Americans, 2010  USDA's MyPlate  ASA Prophylaxis  Lung CA Screening    Ra Cardenas MD  [facility]  "

## 2021-03-22 NOTE — LETTER
March 30, 2021      George Morgan  [address] [place] [address]        Dear ,    We are writing to inform you of your test results.    Test results all look good at this time.  The antibody screen for Covid was positive but this is not surprising since you had infection last year.  In fact, it is a good thing that the antibodies are still measurable at this time.  Screening for diabetes, hepatitis C, and HIV was all negative.  Cholesterol looks better than it has in the past.  It was nice to see you again and stay well this year.     Resulted Orders   Glucose   Result Value Ref Range    Glucose 88 70 - 99 mg/dL   Lipid panel reflex to direct LDL Fasting   Result Value Ref Range    Cholesterol 200 (H) <200 mg/dL      Comment:      Desirable:       <200 mg/dl    Triglycerides 118 <150 mg/dL    HDL Cholesterol 53 >39 mg/dL    LDL Cholesterol Calculated 123 (H) <100 mg/dL      Comment:      Above desirable:  100-129 mg/dl  Borderline High:  130-159 mg/dL  High:             160-189 mg/dL  Very high:       >189 mg/dl      Non HDL Cholesterol 147 (H) <130 mg/dL      Comment:      Above Desirable:  130-159 mg/dl  Borderline high:  160-189 mg/dl  High:             190-219 mg/dl  Very high:       >219 mg/dl     Hepatitis C antibody   Result Value Ref Range    Hepatitis C Antibody Nonreactive NR^Nonreactive      Comment:      Assay performance characteristics have not been established for newborns,   infants, and children     HIV Antigen Antibody Combo   Result Value Ref Range    HIV Antigen Antibody Combo Nonreactive NR^Nonreactive          Comment:      HIV-1 p24 Ag & HIV-1/HIV-2 Ab Not Detected   COVID-19 Virus (Coronavirus) Antibody & Titer Reflex   Result Value Ref Range    COVID-19 Antibody Screen Positive       Comment:      Antibodies to COVID-19 detected, which may be due to COVID-19 vaccination, or   past or current COVID-19 infection.      COVID-19 Antibody, IgG Titer 1:1,600       Comment:       Qualitative screen for IgG, IgM and IgA antibodies to COVID-19 (SARS-CoV-2)   spike receptor binding domain (RBD) protein, with semi-quantitative   measurement of IgG COVID-19 spike RBD antibodies by endpoint titer. COVID-19   spike RBD antibodies may be elevated due to vaccination, or a past or current   COVID-19 infection.  The qualitative screen for IgG, IgM and IgA COVID-19 spike RBD antibodies is   performed on the [employer] [facility], and this test is approved by the FDA under the   Emergency Use Authorization (EUA).  The IgG titer test was developed and its performance characteristics   determined by the [facility], which is regulated under CLIA as qualified to perform   high-complexity testing. The IgG titer test has not been reviewed by the FDA.   Negative results do not rule out COVID-19 infection.  Results from antibody testing should not be used as the sole basis to diagnose   or exclude SARS-CoV-2 infection or to inform in fection status. COVID-19 PCR   test should be ordered if current infection is suspected. False positive   results may occur in rare cases due to cross-reacting antibodies.  Testing performed by [facility], [facility], [address], [address], [place] [address]         If you have any questions or concerns, please call the clinic at the number listed above.       Sincerely,      Ra Cardenas MD

## 2021-03-23 LAB
SARS-COV-2 AB PNL SERPL IA: POSITIVE
SARS-COV-2 IGG SERPL IA-ACNC: NORMAL

## 2021-04-06 ENCOUNTER — OFFICE VISIT (OUTPATIENT)
Dept: URGENT CARE | Facility: URGENT CARE | Age: 36
End: 2021-04-06
Payer: COMMERCIAL

## 2021-04-06 VITALS
BODY MASS INDEX: 27.42 KG/M2 | RESPIRATION RATE: 16 BRPM | HEART RATE: 65 BPM | OXYGEN SATURATION: 99 % | DIASTOLIC BLOOD PRESSURE: 78 MMHG | SYSTOLIC BLOOD PRESSURE: 114 MMHG | TEMPERATURE: 97.2 F | WEIGHT: 183 LBS

## 2021-04-06 DIAGNOSIS — H60.391 INFECTIVE OTITIS EXTERNA, RIGHT: Primary | ICD-10-CM

## 2021-04-06 PROCEDURE — 99213 OFFICE O/P EST LOW 20 MIN: CPT | Performed by: PHYSICIAN ASSISTANT

## 2021-04-06 RX ORDER — NEOMYCIN SULFATE, POLYMYXIN B SULFATE AND HYDROCORTISONE 10; 3.5; 1 MG/ML; MG/ML; [USP'U]/ML
SUSPENSION/ DROPS AURICULAR (OTIC)
Qty: 10 ML | Refills: 0 | Status: SHIPPED | OUTPATIENT
Start: 2021-04-06 | End: 2021-04-29

## 2021-04-06 NOTE — PROGRESS NOTES
Chief Complaint   Patient presents with     Ear Problem             Differential Diagnosis:  URI Adult/Peds:  Acute right otitis media, Acute left otitis media, Otitis externa and cerumen impaction          ASSESSMENT:     ICD-10-CM    1. Infective otitis externa, right  H60.391 neomycin-polymyxin-hydrocortisone (CORTISPORIN) [id number] otic suspension           PLAN:  I have discussed clinical findings with patient.  Side effects of medications discussed.  Symptomatic care is discussed.  I have discussed the possibility of  worsening symptoms and indication to RTC or go to the ER if they occur.  All questions are answered, patient indicates understanding of these issues and is in agreement with plan.   Patient care instructions are discussed/given at the end of visit.   Lots of rest and fluids.      Cary Yoo PA-C      SUBJECTIVE:  36-year-old male presents for evaluation of right ear pain for 2 to 3 weeks.  No drainage.  No ringing.  No decreased hearing.  Has had cerumen impaction in the past.  Tried some over-the-counter eardrops without resolution.  No fever.  No cough or sore throat.  No nasal congestion.  Does not wear ear buds.      No Known Allergies    No past medical history on file.    cetirizine HCl (ZYRTEC ALLERGY) 10 MG CAPS,   clobetasol (TEMOVATE) 0.05 % external cream, Apply to area bid for 5- 7 days, then bid as needed (Patient not taking: Reported on 6/26/2020)    No current facility-administered medications on file prior to visit.       Social History     Tobacco Use     Smoking status: Never Smoker     Smokeless tobacco: Never Used   Substance Use Topics     Alcohol use: No       ROS:  CONSTITUTIONAL: Negative for fatigue or fever.  EYES: Negative for eye problems.  ENT: As above.  RESP: Neg for SOB.  CV: Negative for chest pains.  GI: Negative for vomiting.  MUSCULOSKELETAL:  Negative for significant muscle or joint pains.  NEUROLOGIC: Negative for headaches.  SKIN: Negative for  rash.  PSYCH: Normal mentation for age.    OBJECTIVE:  /78 (BP Location: Left arm, Patient Position: Sitting, Cuff Size: Adult Regular)   Pulse 65   Temp 97.2  F (36.2  C) (Tympanic)   Resp 16   Wt 83 kg (183 lb)   SpO2 99%   BMI 27.42 kg/m      GENERAL APPEARANCE: Healthy, alert and no distress.  EYES:Conjunctiva/sclera clear.  EARS: No cerumen.   Left TM is translucent.  Mild right tragal tenderness.  Right outer canal is with erythema and inflammation.  Right TM is translucent.  No mastoid tenderness.  THROAT: No erythema w/o tonsillar enlargement . No exudates.  NECK: Supple, nontender, no lymphadenopathy.  RESP: Breathing comfortably   CV: Regular rate and rhythm, normal S1 S2, no murmur noted.  NEURO: Awake, alert    SKIN: No rashes      Cary Yoo PA-C

## 2021-04-06 NOTE — PATIENT INSTRUCTIONS
Patient Education     External Ear Infection (Adult)    External otitis (also called  swimmer s ear ) is an infection in the ear canal. It's often caused by bacteria or fungus. It can occur a few days after water gets trapped in the ear canal (from swimming or bathing). It can also occur after cleaning too deeply in the ear canal with a cotton swab or other object. Sometimes, hair care products get into the ear canal and cause this problem.   Symptoms can include pain, fever, itching, redness, drainage, or swelling of the ear canal. Temporary hearing loss may also occur.   Home care    Don't try to clean the ear canal. This can push pus and bacteria deeper into the canal.    Use prescribed ear drops as directed. These help reduce swelling and fight the infection. If an ear wick was placed in the ear canal, apply drops right onto the end of the wick. The wick will draw the medicine into the ear canal even if it's swollen closed.    A cotton ball may be loosely placed in the outer ear to absorb any drainage.    You may use over-the-counter medicines to control pain as directed by the healthcare provider, unless another medicine was prescribed. Talk with your provider before using these medicines if you have chronic liver or kidney disease or ever had a stomach ulcer or digestive tract bleeding.    Don't allow water to get into your ear when bathing. Also don't swim until the infection has cleared.    Prevention    Keep your ears dry. This helps lower the risk of infection. Dry your ears with a towel or hair dryer after getting wet. Also, use ear plugs when swimming.    Don't stick any objects in the ear to remove wax.    Talk with your provider about using ear drops to prevent swimmer's ear in case you feel water trapped in your ear canal. You can get these drops over the counter at most drugstores. They work by removing water from the ear canal.    Follow-up care  Follow up with your healthcare provider in 1 week,  or as advised.   When to seek medical advice  Call your healthcare provider right away if any of these occur:     Ear pain becomes worse or doesn t improve after 3 days of treatment    Redness or swelling of the outer ear occurs or gets worse    Headache    Fever of 100.4 F (38 C) or higher, or as directed by your healthcare provider  Call 911  Call 911 or get immediate medical care if any of the following occur:     Seizure    Unusual drowsiness or confusion    Unusual painful or stiff neck    Stu last reviewed this educational content on 8/1/2020 2000-2020 [employer]. All rights reserved. This information is not intended as a substitute for professional medical care. Always follow your healthcare professional's instructions.

## 2021-04-29 ENCOUNTER — OFFICE VISIT (OUTPATIENT)
Dept: URGENT CARE | Facility: URGENT CARE | Age: 36
End: 2021-04-29
Payer: COMMERCIAL

## 2021-04-29 VITALS
TEMPERATURE: 97.2 F | HEART RATE: 61 BPM | DIASTOLIC BLOOD PRESSURE: 52 MMHG | BODY MASS INDEX: 26.76 KG/M2 | RESPIRATION RATE: 16 BRPM | SYSTOLIC BLOOD PRESSURE: 103 MMHG | WEIGHT: 178.6 LBS | OXYGEN SATURATION: 98 %

## 2021-04-29 DIAGNOSIS — H65.01 NON-RECURRENT ACUTE SEROUS OTITIS MEDIA OF RIGHT EAR: Primary | ICD-10-CM

## 2021-04-29 DIAGNOSIS — J30.2 SEASONAL ALLERGIC RHINITIS, UNSPECIFIED TRIGGER: ICD-10-CM

## 2021-04-29 PROCEDURE — 99213 OFFICE O/P EST LOW 20 MIN: CPT | Performed by: NURSE PRACTITIONER

## 2021-04-29 ASSESSMENT — PAIN SCALES - GENERAL: PAINLEVEL: NO PAIN (0)

## 2021-04-29 NOTE — PATIENT INSTRUCTIONS
Patient Education     Allergic Rhinitis  Allergic rhinitis is an allergic reaction that affects the nose, and often the eyes. It s often known as nasal allergies. Nasal allergies are often due to things in the environment that are breathed in. Depending what you are sensitive to, nasal allergies may occur only during certain seasons, or they may occur year round. Common indoor allergens include house dust mites, mold, cockroaches, and pet dander. Outdoor allergens include pollen from trees, grasses, and weeds.    Symptoms include a drippy, stuffy, and itchy nose. They also include sneezing and red and itchy eyes. You may feel tired more often. Severe allergies may also affect your breathing and trigger a condition called asthma.    Tests can be done to see what allergens are affecting you. You may be referred to an allergy specialist for testing and further evaluation.   Home care  Your healthcare provider may prescribe medicines to help relieve allergy symptoms. These may include oral medicines, nasal sprays, or eye drops.   Ask your provider for advice on how to stay away from substances that you are allergic to. Below are a few tips for each type of allergen.   Pet dander:    Do not have pets with fur and feathers.    If you have a pet, keep it out of your bedroom and off upholstered furniture.  Pollen:    When pollen counts are high, keep windows of your car and home closed. If possible, use an air conditioner instead.    Wear a filter mask when mowing or doing yard work.  House dust mites:    Wash bedding every week in warm water and detergent and dry on a hot setting.    Cover the mattress, box spring, and pillows with allergy covers.     If possible, sleep in a room with no carpet, curtains, or upholstered furniture.  Cockroaches:    Store food in sealed containers.    Remove garbage from the home promptly.    Fix water leaks.  Mold:    Keep humidity low by using a dehumidifier or air conditioner. Keep the  dehumidifier and air conditioner clean and free of mold.    Clean moldy areas with bleach and water. Don't mix bleach with other .  In general:    Vacuum once or twice a week. If possible, use a vacuum with a high-efficiency particulate air (HEPA) filter.    Don't smoke. Stay away from cigarette smoke. Cigarette smoke is an irritant that can make symptoms worse.  Follow-up care  Follow up as advised by the healthcare provider or our staff. If you were referred to an allergy specialist, make this appointment promptly.   When to seek medical advice  Call your healthcare provider or get medical care right away if the following occur:     Coughing    Fever of 100.4 F (38 C) or higher, or as directed by your healthcare provider    Raised red bumps (hives)    Continuing symptoms, new symptoms, or worsening symptoms  Call 911  Call 911 if you have:     Trouble breathing    Severe swelling of the face or severe itching of the eyes or mouth    Wheezing or shortness of breath    Chest tightness    Dizziness or lightheadedness    Feeling of doom    Stomach pain, bloating, vomiting, or diarrhea  [employer] last reviewed this educational content on 10/1/2019    4110-2241 [employer]. All rights reserved. This information is not intended as a substitute for professional medical care. Always follow your healthcare professional's instructions.           Patient Education     Common Middle Ear Problems  Middle ear problems may be caused by something that occurs at birth or right after birth (congenital). This may be an inherited condition. Or it may be due to medicines or to a viral infection such as hepatitis, HIV, syphilis, or cytomegalovirus. Over time, certain growths or bone disease can also harm the middle ear. Left untreated, middle ear problems often lead to lifelong (permanent) hearing loss.  The ear has 3 main parts: the outer ear, middle ear, and inner ear. The middle ear is made up of:    The eardrum  (tympanic membrane)    An air-filled space with bones (ossicles) that link the eardrum to the inner ear     There are 3 types of hearing loss:    Conductive hearing loss. This is caused by anything that limits outside sound from getting into the inner ear.    Sensorineural hearing loss. This type affects the inner ear (cochlea) or the auditory nerve.    Mixed hearing loss. This is a combination of conductive and sensorineural hearing loss.    Injury, infection, certain growths, or bone disease can cause your symptoms. A ruptured eardrum or a long-lasting (chronic) ear infection may be painful and decrease hearing.  Symptoms    Hearing loss in one or both ears    Fluid, often smelly, draining from the ear    Mild pain or pressure in the ear    Ringing in the ear    Types of hearing loss  Conductive hearing loss  Sound waves may be disrupted before they get to the inner ear. If this happens, you may have conductive hearing loss. Loud sounds may be muffled. And it can be hard to hear soft sounds.  Causes can include:    Fluid in the middle ear    Ear infection (otitis media)    Infection in the ear canal (swimmer s ear or external otitis)    Earwax in the ear canal    Noncancer (benign) tumors blocking the outer or middle ear    A hole in your eardrum (perforated eardrum)    Something stuck in the outer ear    Structural problem in the outer or middle ear  This type of hearing loss can often be treated with medicine or surgery.  Sensorineural hearing loss  This is the most common type of lifelong hearing loss. It occurs after damage to the inner ear. It can also occur when there are problems with the nerves that travel from the inner ear to the brain. You may find it hard to hear soft sounds. Louder sounds may not be clear. Or they may be muffled.  Causes can include:    Illnesses    Certain medicines that damage the ear    Age-related hearing loss (presbycusis)    Family history of hearing loss    Head injury    Loud  noise exposure    Structural problem in the inner ear  In some cases it may be hard to know the cause of sensorineural hearing loss. Some metabolic disorders, such as diabetes, have been linked to it. If your hearing loss is unexplained, you may need tests to help find the cause. These can include:    Blood sugar level tests    Complete blood count (CBC)    Thyroid function tests    Syphilis blood tests  In most cases, a sensorineural hearing loss can t be fixed with medicine or surgery. Hearing aids may be needed.  Mixed hearing loss  This type occurs when a conductive hearing loss happens at the same time as a sensorineural hearing loss. It is a problem in your outer or middle ear and in your inner ear. You may not hear as well in one or both ears.  Treatment for mixed hearing loss may be a combination of medicine or surgery, as well as hearing aids.  [employer] last reviewed this educational content on 7/1/2019 2000-2021 [employer]. All rights reserved. This information is not intended as a substitute for professional medical care. Always follow your healthcare professional's instructions.

## 2021-04-29 NOTE — PROGRESS NOTES
.  SUBJECTIVE:  George Morgan is a 36 year old male who presents with right ear pain, fullness and hearing loss for 7 day(s). He was seen on 4/6/21 and treated for otitis externa and did 7 days of the treatment and was better until about a week ago returned symptoms but not the same as the prior visit. He does clean his ears wit q-tips but is not swimmer or at risk for ear infections.   Severity: moderate   Timing:gradual onset and worsening  Additional symptoms include post-nasal drip and rhinorrhea due to seasonal allergies and uses Nasonex and Zyrtec    History of recurrent otitis: no    No past medical history on file.  Current Outpatient Medications   Medication Sig Dispense Refill     cetirizine HCl (ZYRTEC ALLERGY) 10 MG CAPS        Social History     Tobacco Use     Smoking status: Never Smoker     Smokeless tobacco: Never Used   Substance Use Topics     Alcohol use: No       ROS:   Review of systems negative except as stated above.    OBJECTIVE:  /52 (BP Location: Left arm, Patient Position: Sitting, Cuff Size: Adult Regular)   Pulse 61   Temp 97.2  F (36.2  C) (Tympanic)   Resp 16   Wt 81 kg (178 lb 9.6 oz)   SpO2 98%   BMI 26.76 kg/m     EXAM:  The right TM is distorted light reflex, erythematous and obscured landmarks     The right auditory canal is erythematous and swollen  The left TM is normal: no effusions, no erythema, and normal landmarks  The left auditory canal is normal and without drainage, edema or erythema  Oropharynx exam is normal: no lesions, erythema, adenopathy or exudate.  GENERAL: no acute distress  EYES: EOMI,  PERRL, conjunctiva clear  NECK: supple, non-tender to palpation, no adenopathy noted  RESP: lungs clear to auscultation - no rales, rhonchi or wheezes  CV: regular rates and rhythm, normal S1 S2, no murmur noted  SKIN: no suspicious lesions or rashes     ASSESSMENT:  Acute otitis media, right and Otalgia  Seasonal allergies    PLAN:  See orders in Epic  Continue  with current medications (Nasonex/Zytec). Avoid water in the ear until resolved. If there is no improvement will need to follow-up with PCP and possible ENT referral.

## 2021-06-01 ENCOUNTER — IMMUNIZATION (OUTPATIENT)
Dept: NURSING | Facility: CLINIC | Age: 36
End: 2021-06-01
Payer: COMMERCIAL

## 2021-06-01 PROCEDURE — 91300 PR COVID VAC PFIZER DIL RECON 30 MCG/0.3 ML IM: CPT

## 2021-06-01 PROCEDURE — 0001A PR COVID VAC PFIZER DIL RECON 30 MCG/0.3 ML IM: CPT

## 2021-06-14 ENCOUNTER — OFFICE VISIT (OUTPATIENT)
Dept: URGENT CARE | Facility: URGENT CARE | Age: 36
End: 2021-06-14
Payer: COMMERCIAL

## 2021-06-14 ENCOUNTER — ANCILLARY PROCEDURE (OUTPATIENT)
Dept: GENERAL RADIOLOGY | Facility: CLINIC | Age: 36
End: 2021-06-14
Attending: NURSE PRACTITIONER
Payer: COMMERCIAL

## 2021-06-14 VITALS
HEART RATE: 68 BPM | WEIGHT: 178.8 LBS | RESPIRATION RATE: 18 BRPM | DIASTOLIC BLOOD PRESSURE: 71 MMHG | TEMPERATURE: 96.4 F | OXYGEN SATURATION: 100 % | SYSTOLIC BLOOD PRESSURE: 110 MMHG | BODY MASS INDEX: 26.79 KG/M2

## 2021-06-14 DIAGNOSIS — S90.02XA CONTUSION OF LEFT ANKLE, INITIAL ENCOUNTER: ICD-10-CM

## 2021-06-14 DIAGNOSIS — S99.912A ANKLE INJURY, LEFT, INITIAL ENCOUNTER: Primary | ICD-10-CM

## 2021-06-14 PROCEDURE — 73610 X-RAY EXAM OF ANKLE: CPT | Mod: LT | Performed by: RADIOLOGY

## 2021-06-14 PROCEDURE — 99214 OFFICE O/P EST MOD 30 MIN: CPT | Performed by: NURSE PRACTITIONER

## 2021-06-14 ASSESSMENT — ENCOUNTER SYMPTOMS
RHINORRHEA: 0
DIAPHORESIS: 0
COUGH: 0
FEVER: 0
DIARRHEA: 0
NAUSEA: 0
SHORTNESS OF BREATH: 0
VOMITING: 0
CHILLS: 0
SORE THROAT: 0

## 2021-06-14 ASSESSMENT — PAIN SCALES - GENERAL: PAINLEVEL: SEVERE PAIN (6)

## 2021-06-14 NOTE — PATIENT INSTRUCTIONS
Patient Education     Lower Extremity Bruise  You have a bruise (contusion on a leg, knee, ankle, foot, or toe. Symptoms include pain, swelling, and skin discoloration. No bones are broken. This injury may take from a few days to a few weeks to heal.  During that time, the bruise may change from reddish in color, to purple-blue, to green-yellow, to yellow-brown.   Home care    Unless another medicine was prescribed, you can take acetaminophen, ibuprofen, or naproxen to control pain. Talk with your healthcare provider before using these medicines if you have chronic liver or kidney disease or ever had a stomach ulcer or digestive bleeding.    Elevate the injured area to reduce pain and swelling. As much as possible, sit or lie down with the injured area raised about the level of your heart. This is especially important during the first 48 hours.    Ice the injured area to help reduce pain and swelling. Wrap an ice pack or ice cubes in a plastic bag in a thin towel. Apply to the bruised area for 20 minutes every 1 to 2 hours the first day. Continue this 3 to 4 times a day until the pain and swelling goes away.    If crutches have been advised, don't bear full weight on the injured leg until you can do so without pain. You may return to sports when you are able to put full weight and impact on the injured leg without pain.    Follow up  Follow up with your healthcare provider, or as advised. Call if you are not improving within the next 1 to 2 weeks.   When to seek medical advice   Call your healthcare provider right away if any of these occur:    Increased pain or swelling    Foot or toes become cold, blue, numb or tingly    Signs of infection: Warmth, drainage, or increased redness or pain around the injury    Inability to move the injured area, or any joints below the injured area    Frequent bruising for unknown reasons  [employer] last reviewed this educational content on 8/1/2019 2000-2021 [employer]. All rights reserved. This information is not intended as a substitute for professional medical care. Always follow your healthcare professional's instructions.

## 2021-06-14 NOTE — PROGRESS NOTES
SUBJECTIVE:   George Morgan is a 36 year old male presenting with a chief complaint of   Chief Complaint   Patient presents with     Musculoskeletal Problem     Left ankle hurts yesterday and swollen-was playing and flipped        He is an established patient of [facility].    Left ankle injury  S: George Morgan is a 36 year old male who complains of inversion injury to the left ankle 1 days ago. Patient was playing soccer and twisted left ankle. There is pain and swelling at the lateral aspect of that ankle. The patient was able to bear weight directly after the injury.      Review of Systems   Constitutional: Negative for chills, diaphoresis and fever.   HENT: Negative for congestion, ear pain, rhinorrhea and sore throat.    Respiratory: Negative for cough and shortness of breath.    Gastrointestinal: Negative for diarrhea, nausea and vomiting.   Musculoskeletal:        Left ankle pain   All other systems reviewed and are negative.      No past medical history on file.  Family History   Problem Relation Age of Onset     Arthritis Mother      Other - See Comments Father         pt was very young, possible complications of diabetes     Diabetes No family hx of      Hypertension No family hx of      Current Outpatient Medications   Medication Sig Dispense Refill     IBUPROFEN PO        cetirizine HCl (ZYRTEC ALLERGY) 10 MG CAPS        Social History     Tobacco Use     Smoking status: Never Smoker     Smokeless tobacco: Never Used   Substance Use Topics     Alcohol use: No       OBJECTIVE  /71   Pulse 68   Temp 96.4  F (35.8  C) (Tympanic)   Resp 18   Wt 81.1 kg (178 lb 12.8 oz)   SpO2 100%   BMI 26.79 kg/m      Physical Exam  Vitals signs and nursing note reviewed.   Constitutional:       General: He is not in acute distress.     Appearance: He is well-developed. He is not diaphoretic.   HENT:      Head: Normocephalic and atraumatic.      Right Ear: Tympanic membrane and external ear normal.      Left  Ear: Tympanic membrane and external ear normal.   Eyes:      Pupils: Pupils are equal, round, and reactive to light.   Neck:      Musculoskeletal: Normal range of motion and neck supple.   Pulmonary:      Effort: Pulmonary effort is normal. No respiratory distress.      Breath sounds: Normal breath sounds.   Musculoskeletal:      Comments: Tenderness and swelling of the left lateral malleolus.  Slight reduction in range of motion.  Neurovascular exam is intact.   Lymphadenopathy:      Cervical: No cervical adenopathy.   Skin:     General: Skin is warm and dry.   Neurological:      Mental Status: He is alert.      Cranial Nerves: No cranial nerve deficit.         Labs:  No results found for this or any previous visit (from the past 24 hour(s)).    X-Ray was done, my findings are: no fractures or dislocation    ASSESSMENT:      ICD-10-CM    1. Ankle injury, left, initial encounter  S99.912A XR Ankle Left G/E 3 Views        Medical Decision Making:    Differential Diagnosis:  MS Injury Pain: sprain, fracture, tendonitis, muscle strain, contusion and dislocation    PLAN:  We discussed the x-ray results.  Rest painful area  ICE  Elevated  His bandage is applied.  Pain medication as advised  Side effects of medication discussed  As pain subsides, stretching is advised  Consider physical therapy if pain is persisting after symptomatic treatment  All questions answered and patient is in agreement with treatment paln      There are no Patient Instructions on file for this visit.

## 2021-06-22 ENCOUNTER — IMMUNIZATION (OUTPATIENT)
Dept: NURSING | Facility: CLINIC | Age: 36
End: 2021-06-22
Attending: INTERNAL MEDICINE
Payer: COMMERCIAL

## 2021-06-22 PROCEDURE — 0002A PR COVID VAC PFIZER DIL RECON 30 MCG/0.3 ML IM: CPT

## 2021-06-22 PROCEDURE — 91300 PR COVID VAC PFIZER DIL RECON 30 MCG/0.3 ML IM: CPT

## 2021-09-09 ENCOUNTER — OFFICE VISIT (OUTPATIENT)
Dept: SURGERY | Facility: CLINIC | Age: 36
End: 2021-09-09
Payer: COMMERCIAL

## 2021-09-09 VITALS
HEART RATE: 71 BPM | BODY MASS INDEX: 26.67 KG/M2 | DIASTOLIC BLOOD PRESSURE: 70 MMHG | WEIGHT: 178 LBS | SYSTOLIC BLOOD PRESSURE: 106 MMHG

## 2021-09-09 DIAGNOSIS — K60.2 ANAL FISSURE: Primary | ICD-10-CM

## 2021-09-09 PROCEDURE — 99204 OFFICE O/P NEW MOD 45 MIN: CPT | Performed by: SURGERY

## 2021-09-09 RX ORDER — SENNA AND DOCUSATE SODIUM 50; 8.6 MG/1; MG/1
2 TABLET, FILM COATED ORAL 2 TIMES DAILY
Qty: 28 TABLET | Refills: 0 | Status: SHIPPED | OUTPATIENT
Start: 2021-09-09 | End: 2024-01-04

## 2021-09-09 RX ORDER — DOCUSATE SODIUM 100 MG/1
100 CAPSULE, LIQUID FILLED ORAL 2 TIMES DAILY
Qty: 28 CAPSULE | Refills: 0 | Status: CANCELLED | OUTPATIENT
Start: 2021-09-09

## 2021-09-09 NOTE — PROGRESS NOTES
HPI:  Patient is a 36 year old male  with complaints pain  Hurts to have a BM better with a hemorrhoid and fissure cream. Overall feeling improved  The symptoms started several weeks ago    Review Of Systems  10 point ROS neg other than the symptoms noted above in the HPI.      /70   Pulse 71   Wt 80.7 kg (178 lb)   BMI 26.67 kg/m        No past medical history on file.    Past Surgical History:   Procedure Laterality Date     NO HISTORY OF SURGERY         Current Outpatient Medications   Medication Sig Dispense Refill     cetirizine HCl (ZYRTEC ALLERGY) 10 MG CAPS        IBUPROFEN PO          Social History     Socioeconomic History     Marital status:      Spouse name: Not on file     Number of children: 3     Years of education: Not on file     Highest education level: Not on file   Occupational History     Occupation: self employted     Comment: delivery business   Tobacco Use     Smoking status: Never Smoker     Smokeless tobacco: Never Used   Substance and Sexual Activity     Alcohol use: No     Drug use: No     Sexual activity: Yes     Partners: Female   Other Topics Concern     Parent/sibling w/ CABG, MI or angioplasty before 65F 55M? No   Social History Narrative    Live at home with wife and one son, and 3 daughters.     Social Determinants of Health     Financial Resource Strain:      Difficulty of Paying Living Expenses:    Food Insecurity:      Worried About Running Out of Food in the Last Year:      Ran Out of Food in the Last Year:    Transportation Needs:      Lack of Transportation (Medical):      Lack of Transportation (Non-Medical):    Physical Activity:      Days of Exercise per Week:      Minutes of Exercise per Session:    Stress:      Feeling of Stress :    Social Connections:      Frequency of Communication with Friends and Family:      Frequency of Social Gatherings with Friends and Family:      Attends Voodoo Services:      Active Member of Clubs or Organizations:       Attends Club or Organization Meetings:      Marital Status:    Intimate Partner Violence:      Fear of Current or Ex-Partner:      Emotionally Abused:      Physically Abused:      Sexually Abused:        Physical exam:   Constitutional: healthy, alert and no distress  Eyes: Pupils round and equal, no icterus   ENT: Mucous membranes moist  Respiratory:  Non-labored respiration  Musculoskeletal: No gross deformity  Neurologic: No gross focal deficits  Psychiatric: mentation appears normal and affect normal/bright  Hematologic/Lymphatic/Immunologic: No bruising noted  Skin: No lesions, rashes, erythema or jaundice noted      Rectal exam: Unable to do rectal exam due to pain external exam showed a non thrombosed external hermorrhoid    Chart reviewed    Labs: Glucose WNL, hepatitis C and HIV are negative, COVID positive 3/22/21    Assessment: Anal fissure    Plan  Discussed anal fissure treatment including fiber and senna-s to soften the stool, nifedipine cream 0.2 % with prescription and how to use it every 12 hours, and viscus lidocaine 2% for discomfort and prescription given if patient wanted it.

## 2021-09-09 NOTE — LETTER
9/9/2021         RE: George Morgan  [address] [place] [place] [address]        Dear Colleague,    Thank you for referring your patient, George Morgan, to the [facility]. Please see a copy of my visit note below.    HPI:  Patient is a 36 year old male  with complaints pain  Hurts to have a BM better with a hemorrhoid and fissure cream. Overall feeling improved  The symptoms started several weeks ago    Review Of Systems  10 point ROS neg other than the symptoms noted above in the HPI.      /70   Pulse 71   Wt 80.7 kg (178 lb)   BMI 26.67 kg/m        No past medical history on file.    Past Surgical History:   Procedure Laterality Date     NO HISTORY OF SURGERY         Current Outpatient Medications   Medication Sig Dispense Refill     cetirizine HCl (ZYRTEC ALLERGY) 10 MG CAPS        IBUPROFEN PO          Social History     Socioeconomic History     Marital status:      Spouse name: Not on file     Number of children: 3     Years of education: Not on file     Highest education level: Not on file   Occupational History     Occupation: self employted     Comment: delivery business   Tobacco Use     Smoking status: Never Smoker     Smokeless tobacco: Never Used   Substance and Sexual Activity     Alcohol use: No     Drug use: No     Sexual activity: Yes     Partners: Female   Other Topics Concern     Parent/sibling w/ CABG, MI or angioplasty before 65F 55M? No   Social History Narrative    Live at home with wife and one son, and 3 daughters.     Social Determinants of Health     Financial Resource Strain:      Difficulty of Paying Living Expenses:    Food Insecurity:      Worried About Running Out of Food in the Last Year:      Ran Out of Food in the Last Year:    Transportation Needs:      Lack of Transportation (Medical):      Lack of Transportation (Non-Medical):    Physical Activity:      Days of Exercise per Week:      Minutes of Exercise per Session:    Stress:       Feeling of Stress :    Social Connections:      Frequency of Communication with Friends and Family:      Frequency of Social Gatherings with Friends and Family:      Attends Mandaeism Services:      Active Member of Clubs or Organizations:      Attends Club or Organization Meetings:      Marital Status:    Intimate Partner Violence:      Fear of Current or Ex-Partner:      Emotionally Abused:      Physically Abused:      Sexually Abused:        Physical exam:   Constitutional: healthy, alert and no distress  Eyes: Pupils round and equal, no icterus   ENT: Mucous membranes moist  Respiratory:  Non-labored respiration  Musculoskeletal: No gross deformity  Neurologic: No gross focal deficits  Psychiatric: mentation appears normal and affect normal/bright  Hematologic/Lymphatic/Immunologic: No bruising noted  Skin: No lesions, rashes, erythema or jaundice noted      Rectal exam: Unable to do rectal exam due to pain external exam showed a non thrombosed external hermorrhoid    Chart reviewed    Labs: Glucose WNL, hepatitis C and HIV are negative, COVID positive 3/22/21    Assessment: Anal fissure    Plan  Discussed anal fissure treatment including fiber and senna-s to soften the stool, nifedipine cream 0.2 % with prescription and how to use it every 12 hours, and viscus lidocaine 2% for discomfort and prescription given if patient wanted it.        Again, thank you for allowing me to participate in the care of your patient.        Sincerely,        Rian Archibald, DO

## 2021-10-30 ENCOUNTER — OFFICE VISIT (OUTPATIENT)
Dept: URGENT CARE | Facility: URGENT CARE | Age: 36
End: 2021-10-30
Payer: COMMERCIAL

## 2021-10-30 ENCOUNTER — ANCILLARY PROCEDURE (OUTPATIENT)
Dept: GENERAL RADIOLOGY | Facility: CLINIC | Age: 36
End: 2021-10-30
Attending: INTERNAL MEDICINE
Payer: COMMERCIAL

## 2021-10-30 VITALS
BODY MASS INDEX: 26.25 KG/M2 | DIASTOLIC BLOOD PRESSURE: 64 MMHG | HEART RATE: 66 BPM | TEMPERATURE: 97.2 F | WEIGHT: 175.2 LBS | SYSTOLIC BLOOD PRESSURE: 100 MMHG | OXYGEN SATURATION: 100 %

## 2021-10-30 DIAGNOSIS — S76.212A STRAIN OF GROIN, LEFT, INITIAL ENCOUNTER: ICD-10-CM
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
## 2021-10-30 DIAGNOSIS — S79.912A HIP INJURY, LEFT, INITIAL ENCOUNTER: ICD-10-CM

## 2021-10-30 DIAGNOSIS — S79.912A HIP INJURY, LEFT, INITIAL ENCOUNTER: Primary | ICD-10-CM

## 2021-10-30 PROCEDURE — 99213 OFFICE O/P EST LOW 20 MIN: CPT | Performed by: INTERNAL MEDICINE

## 2021-10-30 PROCEDURE — 73502 X-RAY EXAM HIP UNI 2-3 VIEWS: CPT | Performed by: RADIOLOGY

## 2021-10-30 RX ORDER — NAPROXEN 500 MG/1
500 TABLET ORAL 2 TIMES DAILY WITH MEALS
Qty: 28 TABLET | Refills: 0 | Status: SHIPPED | OUTPATIENT
Start: 2021-10-30 | End: 2021-11-13

## 2021-10-30 NOTE — PROGRESS NOTES
ASSESSMENT AND PLAN:      ICD-10-CM    1. Hip injury, left, initial encounter  S79.912A XR Hip Left 2-3 Views     [facility] Referral     naproxen (NAPROSYN) 500 MG tablet     Orthopedic  Referral   2. Strain of groin, left, initial encounter  S76.212A XR Hip Left 2-3 Views     [facility] Referral     naproxen (NAPROSYN) 500 MG tablet     Orthopedic  Referral   3. Injury while playing soccer  Y93.66 Orthopedic  Referral       Differential Diagnosis:  MS Injury Pain: sprain, fracture and hip necrosis, inguinal sports hernia, osteoarthritis   Serious Comorbid Conditions:  Adult:  None    PLAN:      Patient Instructions     X-ray appears normal.    Take naproxen 500 mg twice daily with food for pain.    As symptoms persistent for 3 weeks:  See physical therapist.  Referral done  See sports medicine.  Orthopedic referral done      Patient Education     Groin Strain (Adult)  A groin strain is a stretching or partial tearing of the muscle in the lower belly (abdomen) or upper thigh. This may happen because of too much coughing, heavy lifting, or active sports. The pain may last for several days or weeks, depending on how bad the stretch or tear is. It will generally get better with rest, ice, and anti-inflammatory medicines.  A groin strain can lead to a groin hernia. This is also called an inguinal hernia. A hernia is a complete tear of the abdominal muscle. This allows fat or the intestines to bulge out and create a visible bump just above the thigh crease. This is a more serious problem and may need surgery to repair it. When you lie down, the bump should get smaller or disappear completely. If it doesn t, and you are not able to flatten it with your hand, you need medical attention right away.  Home care    Don t do any heavy lifting, straining, or any activities that cause groin pain.    You may use over-the-counter pain medicine to control pain, unless another pain medicine was  prescribed. If you have chronic liver or kidney disease or ever had a stomach ulcer or GI (gastrointestinal) bleeding, talk with your healthcare provider before using these medicines.  Follow-up care  Follow up with your healthcare provider, or as advised. Make an appointment with your healthcare provider if you develop a bump in the area of the groin strain.  When to seek medical advice  Call your healthcare provider right away if any of these occur:    Increasing pain in the area of the groin strain    Tender bump just above the groin crease that does not flatten when you lie down or press on it    Overall abdominal swelling or pain    Fever of 100.4 F (38 C) or above lasting for 24 to 48 hours    Chills    Repeated vomiting    Pain that moves to the lower right abdomen, just below the waistline, or spreads to the back  [employer] last reviewed this educational content on 6/1/2018 2000-2021 [employer]. All rights reserved. This information is not intended as a substitute for professional medical care. Always follow your healthcare professional's instructions.             No follow-ups on file.        Beverley Chow MD  [facility]    Subjective     George Morgan is a 36 year old who presents for Patient presents with:  Musculoskeletal Problem: left hip/ groin pain x3 wks; was playing soccer    an established patient of [facility].    MS Injury/Pain    Onset of symptoms was 3 week(s) ago.  Location: left hip  Context:       The injury happened while playing soccer      Mechanism: sports related injury      Patient experienced immediate pain  Course of symptoms is same.    Severity moderately severe  Current and Associated symptoms: Pain and limping    Aggravating Factors: walking and movement  Therapies to improve symptoms include: ibuprofen      Objective    /64   Pulse 66   Temp 97.2  F (36.2  C) (Tympanic)   Wt 79.5 kg (175 lb 3.2 oz)   SpO2 100%   BMI 26.25  kg/m    Physical Exam  Vitals reviewed.   Constitutional:       Appearance: Normal appearance.   Musculoskeletal:      Comments: Palpation of left inguinal area is tender with patient guarding.  No obvious hernia noted.  Pain also with placing the hip in flexion and external rotation  No pain over trochanteric bursa    Observe patient with limp while walking   Neurological:      Mental Status: He is alert.            Xray - Reviewed and interpreted by me.  X-ray appears normal.  Good joint space.  No evidence of osteoarthritis.  Smooth surface.  No evidence of necrosis

## 2021-10-30 NOTE — PATIENT INSTRUCTIONS
X-ray appears normal.    Take naproxen 500 mg twice daily with food for pain.    As symptoms persistent for 3 weeks:  See physical therapist.  Referral done  See sports medicine.  Orthopedic referral done      Patient Education     Groin Strain (Adult)  A groin strain is a stretching or partial tearing of the muscle in the lower belly (abdomen) or upper thigh. This may happen because of too much coughing, heavy lifting, or active sports. The pain may last for several days or weeks, depending on how bad the stretch or tear is. It will generally get better with rest, ice, and anti-inflammatory medicines.  A groin strain can lead to a groin hernia. This is also called an inguinal hernia. A hernia is a complete tear of the abdominal muscle. This allows fat or the intestines to bulge out and create a visible bump just above the thigh crease. This is a more serious problem and may need surgery to repair it. When you lie down, the bump should get smaller or disappear completely. If it doesn t, and you are not able to flatten it with your hand, you need medical attention right away.  Home care    Don t do any heavy lifting, straining, or any activities that cause groin pain.    You may use over-the-counter pain medicine to control pain, unless another pain medicine was prescribed. If you have chronic liver or kidney disease or ever had a stomach ulcer or GI (gastrointestinal) bleeding, talk with your healthcare provider before using these medicines.  Follow-up care  Follow up with your healthcare provider, or as advised. Make an appointment with your healthcare provider if you develop a bump in the area of the groin strain.  When to seek medical advice  Call your healthcare provider right away if any of these occur:    Increasing pain in the area of the groin strain    Tender bump just above the groin crease that does not flatten when you lie down or press on it    Overall abdominal swelling or pain    Fever of 100.4 F  (38 C) or above lasting for 24 to 48 hours    Chills    Repeated vomiting    Pain that moves to the lower right abdomen, just below the waistline, or spreads to the back  [employer] last reviewed this educational content on 6/1/2018 2000-2021 [employer]. All rights reserved. This information is not intended as a substitute for professional medical care. Always follow your healthcare professional's instructions.

## 2022-05-21 ENCOUNTER — HEALTH MAINTENANCE LETTER (OUTPATIENT)
Age: 37
End: 2022-05-21

## 2022-05-24 ENCOUNTER — OFFICE VISIT (OUTPATIENT)
Dept: FAMILY MEDICINE | Facility: CLINIC | Age: 37
End: 2022-05-24
Payer: COMMERCIAL

## 2022-05-24 VITALS
RESPIRATION RATE: 20 BRPM | HEART RATE: 75 BPM | DIASTOLIC BLOOD PRESSURE: 60 MMHG | WEIGHT: 179.6 LBS | BODY MASS INDEX: 27.22 KG/M2 | TEMPERATURE: 97.1 F | SYSTOLIC BLOOD PRESSURE: 110 MMHG | HEIGHT: 68 IN | OXYGEN SATURATION: 100 %

## 2022-05-24 DIAGNOSIS — Z00.00 ROUTINE GENERAL MEDICAL EXAMINATION AT A HEALTH CARE FACILITY: Primary | ICD-10-CM

## 2022-05-24 DIAGNOSIS — K21.00 GASTROESOPHAGEAL REFLUX DISEASE WITH ESOPHAGITIS WITHOUT HEMORRHAGE: ICD-10-CM

## 2022-05-24 LAB
BASOPHILS # BLD AUTO: 0 10E3/UL (ref 0–0.2)
BASOPHILS NFR BLD AUTO: 0 %
EOSINOPHIL # BLD AUTO: 0.2 10E3/UL (ref 0–0.7)
EOSINOPHIL NFR BLD AUTO: 5 %
ERYTHROCYTE [DISTWIDTH] IN BLOOD BY AUTOMATED COUNT: 12.7 % (ref 10–15)
HCT VFR BLD AUTO: 39.3 % (ref 40–53)
HGB BLD-MCNC: 13.4 G/DL (ref 13.3–17.7)
LYMPHOCYTES # BLD AUTO: 2 10E3/UL (ref 0.8–5.3)
LYMPHOCYTES NFR BLD AUTO: 41 %
MCH RBC QN AUTO: 29.6 PG (ref 26.5–33)
MCHC RBC AUTO-ENTMCNC: 34.1 G/DL (ref 31.5–36.5)
MCV RBC AUTO: 87 FL (ref 78–100)
MONOCYTES # BLD AUTO: 0.6 10E3/UL (ref 0–1.3)
MONOCYTES NFR BLD AUTO: 12 %
NEUTROPHILS # BLD AUTO: 2.1 10E3/UL (ref 1.6–8.3)
NEUTROPHILS NFR BLD AUTO: 42 %
PLATELET # BLD AUTO: 213 10E3/UL (ref 150–450)
RBC # BLD AUTO: 4.53 10E6/UL (ref 4.4–5.9)
WBC # BLD AUTO: 4.9 10E3/UL (ref 4–11)

## 2022-05-24 PROCEDURE — 36415 COLL VENOUS BLD VENIPUNCTURE: CPT | Performed by: FAMILY MEDICINE

## 2022-05-24 PROCEDURE — 80053 COMPREHEN METABOLIC PANEL: CPT | Performed by: FAMILY MEDICINE

## 2022-05-24 PROCEDURE — 99395 PREV VISIT EST AGE 18-39: CPT | Performed by: FAMILY MEDICINE

## 2022-05-24 PROCEDURE — 85025 COMPLETE CBC W/AUTO DIFF WBC: CPT | Performed by: FAMILY MEDICINE

## 2022-05-24 PROCEDURE — 80061 LIPID PANEL: CPT | Performed by: FAMILY MEDICINE

## 2022-05-24 ASSESSMENT — ENCOUNTER SYMPTOMS
COUGH: 0
MYALGIAS: 0
CHILLS: 0
DIARRHEA: 0
DIZZINESS: 0
CONSTIPATION: 0
FEVER: 0
PALPITATIONS: 0
DYSURIA: 0
HEMATOCHEZIA: 0
SHORTNESS OF BREATH: 0
ENDOCRINE NEGATIVE: 1
ARTHRALGIAS: 0
ABDOMINAL PAIN: 0
FREQUENCY: 0
NAUSEA: 0
ALLERGIC/IMMUNOLOGIC NEGATIVE: 1
HEMATOLOGIC/LYMPHATIC NEGATIVE: 1
JOINT SWELLING: 0
HEMATURIA: 0
HEADACHES: 0
NERVOUS/ANXIOUS: 0
PARESTHESIAS: 0
SORE THROAT: 0
WEAKNESS: 0
EYE PAIN: 0
HEARTBURN: 0

## 2022-05-24 ASSESSMENT — PAIN SCALES - GENERAL: PAINLEVEL: NO PAIN (0)

## 2022-05-24 NOTE — LETTER
"May 25, 2022      George Ismail  [address] [place] [address]        Dear George,    Total cholesterol, LDL within good range.   Continue with current diet, increasing physical activity, continue to exercise.     Blood sugar, kidney function, liver function all within normal range   Normal for blood count, CBC     Have a good day.     Ways to improve your cholesterol...       1- Eats less saturated fats (including avoiding \"trans\" fats), fatty foods.   Eat more baked food, than fried food.       2 - Eat more unsaturated fats  - found in vegetables, grains, and tree nuts.   Also by replacing butter with canola oil or olive oil.       3 - Eat more nuts.   1-2 ounces (a small handful) of almonds, walnuts, hazelnuts or pecans once a day in place of other less healthy snacks.       4 - Eat more high fiber foods - vegetables and whole grains including oat bran, oats, beans, peas, and flax seed.       5 - Eat more fish - such as salmon, tuna, mackerel, and sardines.  1 or 2 six ounce servings per week is a healthy replacement for other proteins.       6 - Exercise for at least 120 minutes per week - which is equal to 30 minutes 4 days per week.     7- avoid late meals, at least  a few hours before bedtime.          If you have any questions or concerns, please call the clinic at the number listed above.       Sincerely,      Daisy Haley MD      Resulted Orders   Lipid panel reflex to direct LDL Fasting   Result Value Ref Range    Cholesterol 177 <200 mg/dL    Triglycerides 108 <150 mg/dL    Direct Measure HDL 54 >=40 mg/dL    LDL Cholesterol Calculated 101 (H) <=100 mg/dL    Non HDL Cholesterol 123 <130 mg/dL    Patient Fasting > 8hrs? Yes     Narrative    Cholesterol  Desirable:  <200 mg/dL    Triglycerides  Normal:  Less than 150 mg/dL  Borderline High:  150-199 mg/dL  High:  200-499 mg/dL  Very High:  Greater than or equal to 500 mg/dL    Direct Measure HDL  Female:  Greater than or equal to 50 mg/dL "   Male:  Greater than or equal to 40 mg/dL    LDL Cholesterol  Desirable:  <100mg/dL  Above Desirable:  100-129 mg/dL   Borderline High:  130-159 mg/dL   High:  160-189 mg/dL   Very High:  >= 190 mg/dL    Non HDL Cholesterol  Desirable:  130 mg/dL  Above Desirable:  130-159 mg/dL  Borderline High:  160-189 mg/dL  High:  190-219 mg/dL  Very High:  Greater than or equal to 220 mg/dL   Comprehensive metabolic panel (BMP + Alb, Alk Phos, ALT, AST, Total. Bili, TP)   Result Value Ref Range    Sodium 138 133 - 144 mmol/L    Potassium 4.2 3.4 - 5.3 mmol/L    Chloride 107 94 - 109 mmol/L    Carbon Dioxide (CO2) 28 20 - 32 mmol/L    Anion Gap 3 3 - 14 mmol/L    Urea Nitrogen 13 7 - 30 mg/dL    Creatinine 0.69 0.66 - 1.25 mg/dL    Calcium 8.3 (L) 8.5 - 10.1 mg/dL    Glucose 100 (H) 70 - 99 mg/dL    Alkaline Phosphatase 84 40 - 150 U/L    AST 27 0 - 45 U/L    ALT 47 0 - 70 U/L    Protein Total 7.2 6.8 - 8.8 g/dL    Albumin 3.8 3.4 - 5.0 g/dL    Bilirubin Total 0.3 0.2 - 1.3 mg/dL    GFR Estimate >90 >60 mL/min/1.73m2      Comment:      Effective December 21, 2021 eGFRcr in adults is calculated using the 2021 CKD-EPI creatinine equation which includes age and gender (Lydia lewis al., [place], DOI: 10.1056/DGWMsp4042880)   CBC with platelets and differential   Result Value Ref Range    WBC Count 4.9 4.0 - 11.0 10e3/uL    RBC Count 4.53 4.40 - 5.90 10e6/uL    Hemoglobin 13.4 13.3 - 17.7 g/dL    Hematocrit 39.3 (L) 40.0 - 53.0 %    MCV 87 78 - 100 fL    MCH 29.6 26.5 - 33.0 pg    MCHC 34.1 31.5 - 36.5 g/dL    RDW 12.7 10.0 - 15.0 %    Platelet Count 213 150 - 450 10e3/uL    % Neutrophils 42 %    % Lymphocytes 41 %    % Monocytes 12 %    % Eosinophils 5 %    % Basophils 0 %    Absolute Neutrophils 2.1 1.6 - 8.3 10e3/uL    Absolute Lymphocytes 2.0 0.8 - 5.3 10e3/uL    Absolute Monocytes 0.6 0.0 - 1.3 10e3/uL    Absolute Eosinophils 0.2 0.0 - 0.7 10e3/uL    Absolute Basophils 0.0 0.0 - 0.2 10e3/uL

## 2022-05-24 NOTE — PROGRESS NOTES
SUBJECTIVE:   CC: George Morgan is an 37 year old male who presents for preventative health visit.   Comes for annual exam, no past medical history.  Concerns  of H. pylori he has no symptoms.  He reports his wife and children all tested positive for H. pylori.  He denies nausea, denies stomach upset, denies vomiting, no change in his weight.  No blood in the stool.    Patient has been advised of split billing requirements and indicates understanding: Yes  Healthy Habits:     Getting at least 3 servings of Calcium per day:  Yes    Bi-annual eye exam:  NO    Dental care twice a year:  Yes    Sleep apnea or symptoms of sleep apnea:  None    Diet:  Regular (no restrictions)    Frequency of exercise:  4-5 days/week    Duration of exercise:  45-60 minutes    Taking medications regularly:  Yes    Barriers to taking medications:  None    Medication side effects:  None    PHQ-2 Total Score: 0    Additional concerns today:  No      Today's PHQ-2 Score:   PHQ-2 ( 1999 Pf[employer]) 5/24/2022   Q1: Little interest or pleasure in doing things 0   Q2: Feeling down, depressed or hopeless 0   PHQ-2 Score 0   PHQ-2 Total Score (12-17 Years)- Positive if 3 or more points; Administer PHQ-A if positive -   Q1: Little interest or pleasure in doing things Not at all   Q2: Feeling down, depressed or hopeless Not at all   PHQ-2 Score 0       Abuse: Current or Past(Physical, Sexual or Emotional)- No  Do you feel safe in your environment? Yes        Social History     Tobacco Use     Smoking status: Never Smoker     Smokeless tobacco: Never Used   Substance Use Topics     Alcohol use: No     If you drink alcohol do you typically have >3 drinks per day or >7 drinks per week? No    Alcohol Use 5/24/2022   Prescreen: >3 drinks/day or >7 drinks/week? Not Applicable   Prescreen: >3 drinks/day or >7 drinks/week? -   No flowsheet data found.    Last PSA: No results found for: PSA    Reviewed orders with patient. Reviewed health maintenance and updated  orders accordingly - Yes  Labs reviewed in EPIC  BP Readings from Last 3 Encounters:   05/24/22 110/60   10/30/21 100/64   09/09/21 106/70    Wt Readings from Last 3 Encounters:   05/24/22 81.5 kg (179 lb 9.6 oz)   10/30/21 79.5 kg (175 lb 3.2 oz)   09/09/21 80.7 kg (178 lb)                  Patient Active Problem List   Diagnosis     Flexural eczema     Past Surgical History:   Procedure Laterality Date     NO HISTORY OF SURGERY         Social History     Tobacco Use     Smoking status: Never Smoker     Smokeless tobacco: Never Used   Substance Use Topics     Alcohol use: No     Family History   Problem Relation Age of Onset     Arthritis Mother      Hyperlipidemia Mother      Other - See Comments Father         pt was very young, possible complications of diabetes     Diabetes No family hx of      Hypertension No family hx of            Reviewed and updated as needed this visit by clinical staff   Tobacco  Allergies  Meds  Problems  Med Hx  Surg Hx  Fam Hx  Soc   Hx          Reviewed and updated as needed this visit by Provider      Problems              History reviewed. No pertinent past medical history.   Past Surgical History:   Procedure Laterality Date     NO HISTORY OF SURGERY       OB History   No obstetric history on file.       Review of Systems   Constitutional: Negative for chills and fever.   HENT: Negative for congestion, ear pain, hearing loss and sore throat.    Eyes: Negative for pain and visual disturbance.   Respiratory: Negative for cough and shortness of breath.    Cardiovascular: Negative for chest pain, palpitations and peripheral edema.   Gastrointestinal: Negative for abdominal pain, constipation, diarrhea, heartburn, hematochezia and nausea.   Endocrine: Negative.    Genitourinary: Negative for dysuria, frequency, genital sores, hematuria, impotence, penile discharge and urgency.   Musculoskeletal: Negative for arthralgias, joint swelling and myalgias.   Skin: Negative for rash.  "  Allergic/Immunologic: Negative.    Neurological: Negative for dizziness, weakness, headaches and paresthesias.   Hematological: Negative.    Psychiatric/Behavioral: Negative for mood changes. The patient is not nervous/anxious.      CONSTITUTIONAL: NEGATIVE for fever, chills, change in weight  INTEGUMENTARY/SKIN: NEGATIVE for worrisome rashes, moles or lesions  EYES: NEGATIVE for vision changes or irritation  ENT: NEGATIVE for ear, mouth and throat problems  RESP: NEGATIVE for significant cough or SOB  CV: NEGATIVE for chest pain, palpitations or peripheral edema  GI: NEGATIVE for nausea, abdominal pain, heartburn, or change in bowel habits   male: negative for dysuria, hematuria, decreased urinary stream, erectile dysfunction, urethral discharge  MUSCULOSKELETAL: NEGATIVE for significant arthralgias or myalgia  NEURO: NEGATIVE for weakness, dizziness or paresthesias  ENDOCRINE: NEGATIVE for temperature intolerance, skin/hair changes  HEME/ALLERGY/IMMUNE: NEGATIVE for bleeding problems  PSYCHIATRIC: NEGATIVE for changes in mood or affect    OBJECTIVE:   Pulse 75   Temp 97.1  F (36.2  C) (Tympanic)   Resp 20   Ht 1.727 m (5' 8\")   Wt 81.5 kg (179 lb 9.6 oz)   SpO2 100%   BMI 27.31 kg/m      Physical Exam  GENERAL: healthy, alert and no distress  HENT: ear canals and TM's normal, nose and mouth without ulcers or lesions  NECK: no adenopathy, no asymmetry, masses, or scars and thyroid normal to palpation  RESP: lungs clear to auscultation - no rales, rhonchi or wheezes  CV: regular rate and rhythm, normal S1 S2, no S3 or S4, no murmur, click or rub, no peripheral edema and peripheral pulses strong  ABDOMEN: soft, nontender, no hepatosplenomegaly, no masses and bowel sounds normal  MS: no gross musculoskeletal defects noted, no edema  SKIN: no suspicious lesions or rashes  NEURO: Normal strength and tone, mentation intact and speech normal  PSYCH: mentation appears normal, affect normal/bright    Diagnostic " "Test Results:  Labs reviewed in Epic  Orders Placed This Encounter   Procedures     REVIEW OF HEALTH MAINTENANCE PROTOCOL ORDERS     Lipid panel reflex to direct LDL Fasting     Comprehensive metabolic panel (BMP + Alb, Alk Phos, ALT, AST, Total. Bili, TP)     Helicobacter pylori Antigen Stool     CBC with platelets and differential     CBC with platelets and differential       ASSESSMENT/PLAN:   (Z00.00) Routine general medical examination at a health care facility  (primary encounter diagnosis)  Comment: normal exam  Plan: Lipid panel reflex to direct LDL Fasting,         Comprehensive metabolic panel (BMP + Alb, Alk         Phos, ALT, AST, Total. Bili, TP), CBC with         platelets and differential        Routine preventive care discussed.  Advised with diet, exercise, increase physical activity.  One year annual exam follow-up recommended.    Patient declined COVID booster today    (K21.00) Gastroesophageal reflux disease with esophagitis without hemorrhage  Comment:   Plan: Helicobacter pylori Antigen Stool              Patient has been advised of split billing requirements and indicates understanding: Yes    COUNSELING:   Reviewed preventive health counseling, as reflected in patient instructions       Regular exercise       Healthy diet/nutrition       Immunizations    Declined: COVID booster due to Other: need to discuss with wife.            Estimated body mass index is 27.31 kg/m  as calculated from the following:    Height as of this encounter: 1.727 m (5' 8\").    Weight as of this encounter: 81.5 kg (179 lb 9.6 oz).     Weight management plan: Discussed healthy diet and exercise guidelines    He reports that he has never smoked. He has never used smokeless tobacco.      Counseling Resources:  ATP IV Guidelines  Pooled Cohorts Equation Calculator  FRAX Risk Assessment  ICSI Preventive Guidelines  Dietary Guidelines for Americans, 2010  USDA's MyPlate  ASA Prophylaxis  Lung CA Screening    Daisy GUERRERO " MD Sudha  [facility]

## 2022-05-25 LAB
ALBUMIN SERPL-MCNC: 3.8 G/DL (ref 3.4–5)
ALP SERPL-CCNC: 84 U/L (ref 40–150)
ALT SERPL W P-5'-P-CCNC: 47 U/L (ref 0–70)
ANION GAP SERPL CALCULATED.3IONS-SCNC: 3 MMOL/L (ref 3–14)
AST SERPL W P-5'-P-CCNC: 27 U/L (ref 0–45)
BILIRUB SERPL-MCNC: 0.3 MG/DL (ref 0.2–1.3)
BUN SERPL-MCNC: 13 MG/DL (ref 7–30)
CALCIUM SERPL-MCNC: 8.3 MG/DL (ref 8.5–10.1)
CHLORIDE BLD-SCNC: 107 MMOL/L (ref 94–109)
CHOLEST SERPL-MCNC: 177 MG/DL
CO2 SERPL-SCNC: 28 MMOL/L (ref 20–32)
CREAT SERPL-MCNC: 0.69 MG/DL (ref 0.66–1.25)
FASTING STATUS PATIENT QL REPORTED: YES
GFR SERPL CREATININE-BSD FRML MDRD: >90 ML/MIN/1.73M2
GLUCOSE BLD-MCNC: 100 MG/DL (ref 70–99)
HDLC SERPL-MCNC: 54 MG/DL
LDLC SERPL CALC-MCNC: 101 MG/DL
NONHDLC SERPL-MCNC: 123 MG/DL
POTASSIUM BLD-SCNC: 4.2 MMOL/L (ref 3.4–5.3)
PROT SERPL-MCNC: 7.2 G/DL (ref 6.8–8.8)
SODIUM SERPL-SCNC: 138 MMOL/L (ref 133–144)
TRIGL SERPL-MCNC: 108 MG/DL

## 2022-05-27 ENCOUNTER — LAB (OUTPATIENT)
Dept: LAB | Facility: CLINIC | Age: 37
End: 2022-05-27
Payer: COMMERCIAL

## 2022-05-27 DIAGNOSIS — K21.00 REFLUX ESOPHAGITIS: ICD-10-CM

## 2022-05-27 DIAGNOSIS — K21.00 GASTRO-ESOPHAGEAL REFLUX DISEASE WITH ESOPHAGITIS: Primary | ICD-10-CM

## 2022-05-27 LAB
Lab: NORMAL
PERFORMING LABORATORY: NORMAL
SPECIMEN STATUS: NORMAL
TEST NAME: NORMAL

## 2022-05-27 PROCEDURE — 87338 HPYLORI STOOL AG IA: CPT

## 2022-05-28 LAB — MISCELLANEOUS TEST 1 (ARUP): ABNORMAL

## 2022-06-05 DIAGNOSIS — A04.8 HELICOBACTER PYLORI (H. PYLORI) INFECTION: Primary | ICD-10-CM

## 2022-06-05 RX ORDER — CLARITHROMYCIN 500 MG
500 TABLET ORAL 2 TIMES DAILY
Qty: 20 TABLET | Refills: 0 | Status: SHIPPED | OUTPATIENT
Start: 2022-06-05 | End: 2022-06-15

## 2022-06-05 RX ORDER — AMOXICILLIN 875 MG
875 TABLET ORAL 2 TIMES DAILY
Qty: 20 TABLET | Refills: 0 | Status: SHIPPED | OUTPATIENT
Start: 2022-06-05 | End: 2022-06-15

## 2022-06-06 ENCOUNTER — TELEPHONE (OUTPATIENT)
Dept: FAMILY MEDICINE | Facility: CLINIC | Age: 37
End: 2022-06-06
Payer: COMMERCIAL

## 2022-06-06 NOTE — TELEPHONE ENCOUNTER
----- Message from Daisy Haley MD sent at 6/5/2022 10:33 AM CDT -----  Please call pt with result  Positive for H pylori test  Started him on Amoxil, Bixian for 10 days,   In addition, Prilosec , one a day for at least 2 weeks    All sent to [facility] , in [facility].    Thanks

## 2022-06-06 NOTE — TELEPHONE ENCOUNTER
Attempted to reach pt, no answer, left levar to return call to [facility] at [phone].    Tonja Escobar RN

## 2022-06-07 NOTE — TELEPHONE ENCOUNTER
2ed attempt to call patient.    RN left  for patient requesting call back to clinic.    Catrachito Oleary RN, BSN, PHN  [facility]

## 2022-09-18 ENCOUNTER — HEALTH MAINTENANCE LETTER (OUTPATIENT)
Age: 37
End: 2022-09-18

## 2023-02-03 ENCOUNTER — OFFICE VISIT (OUTPATIENT)
Dept: FAMILY MEDICINE | Facility: CLINIC | Age: 38
End: 2023-02-03
Payer: COMMERCIAL

## 2023-02-03 VITALS
TEMPERATURE: 98.4 F | HEART RATE: 58 BPM | DIASTOLIC BLOOD PRESSURE: 68 MMHG | OXYGEN SATURATION: 100 % | WEIGHT: 177 LBS | HEIGHT: 68 IN | RESPIRATION RATE: 18 BRPM | BODY MASS INDEX: 26.83 KG/M2 | SYSTOLIC BLOOD PRESSURE: 100 MMHG

## 2023-02-03 DIAGNOSIS — R53.83 OTHER FATIGUE: ICD-10-CM

## 2023-02-03 DIAGNOSIS — R73.9 ELEVATED BLOOD SUGAR: Primary | ICD-10-CM

## 2023-02-03 DIAGNOSIS — E55.9 VITAMIN D DEFICIENCY: ICD-10-CM

## 2023-02-03 DIAGNOSIS — R35.0 URINARY FREQUENCY: ICD-10-CM

## 2023-02-03 DIAGNOSIS — S93.401A SPRAIN OF RIGHT ANKLE, UNSPECIFIED LIGAMENT, INITIAL ENCOUNTER: ICD-10-CM

## 2023-02-03 LAB
ALBUMIN SERPL BCG-MCNC: 4.5 G/DL (ref 3.5–5.2)
ALBUMIN UR-MCNC: NEGATIVE MG/DL
ALP SERPL-CCNC: 86 U/L (ref 40–129)
ALT SERPL W P-5'-P-CCNC: 30 U/L (ref 10–50)
ANION GAP SERPL CALCULATED.3IONS-SCNC: 11 MMOL/L (ref 7–15)
APPEARANCE UR: CLEAR
AST SERPL W P-5'-P-CCNC: 28 U/L (ref 10–50)
BASOPHILS # BLD AUTO: 0 10E3/UL (ref 0–0.2)
BASOPHILS NFR BLD AUTO: 0 %
BILIRUB SERPL-MCNC: 0.4 MG/DL
BILIRUB UR QL STRIP: NEGATIVE
BUN SERPL-MCNC: 9.2 MG/DL (ref 6–20)
CALCIUM SERPL-MCNC: 9.1 MG/DL (ref 8.6–10)
CHLORIDE SERPL-SCNC: 104 MMOL/L (ref 98–107)
CHOLEST SERPL-MCNC: 174 MG/DL
COLOR UR AUTO: YELLOW
CREAT SERPL-MCNC: 0.75 MG/DL (ref 0.67–1.17)
DEPRECATED HCO3 PLAS-SCNC: 26 MMOL/L (ref 22–29)
EOSINOPHIL # BLD AUTO: 0.2 10E3/UL (ref 0–0.7)
EOSINOPHIL NFR BLD AUTO: 4 %
ERYTHROCYTE [DISTWIDTH] IN BLOOD BY AUTOMATED COUNT: 12.6 % (ref 10–15)
GFR SERPL CREATININE-BSD FRML MDRD: >90 ML/MIN/1.73M2
GLUCOSE SERPL-MCNC: 98 MG/DL (ref 70–99)
GLUCOSE UR STRIP-MCNC: NEGATIVE MG/DL
HBA1C MFR BLD: 5.6 % (ref 0–5.6)
HCT VFR BLD AUTO: 40.5 % (ref 40–53)
HDLC SERPL-MCNC: 44 MG/DL
HGB BLD-MCNC: 13.7 G/DL (ref 13.3–17.7)
HGB UR QL STRIP: NEGATIVE
KETONES UR STRIP-MCNC: NEGATIVE MG/DL
LDLC SERPL CALC-MCNC: 105 MG/DL
LEUKOCYTE ESTERASE UR QL STRIP: NEGATIVE
LYMPHOCYTES # BLD AUTO: 2.2 10E3/UL (ref 0.8–5.3)
LYMPHOCYTES NFR BLD AUTO: 49 %
MCH RBC QN AUTO: 29.5 PG (ref 26.5–33)
MCHC RBC AUTO-ENTMCNC: 33.8 G/DL (ref 31.5–36.5)
MCV RBC AUTO: 87 FL (ref 78–100)
MONOCYTES # BLD AUTO: 0.6 10E3/UL (ref 0–1.3)
MONOCYTES NFR BLD AUTO: 12 %
NEUTROPHILS # BLD AUTO: 1.5 10E3/UL (ref 1.6–8.3)
NEUTROPHILS NFR BLD AUTO: 34 %
NITRATE UR QL: NEGATIVE
NONHDLC SERPL-MCNC: 130 MG/DL
PH UR STRIP: 7 [PH] (ref 5–7)
PLATELET # BLD AUTO: 215 10E3/UL (ref 150–450)
POTASSIUM SERPL-SCNC: 4.4 MMOL/L (ref 3.4–5.3)
PROT SERPL-MCNC: 7.4 G/DL (ref 6.4–8.3)
RBC # BLD AUTO: 4.64 10E6/UL (ref 4.4–5.9)
SODIUM SERPL-SCNC: 141 MMOL/L (ref 136–145)
SP GR UR STRIP: 1.02 (ref 1–1.03)
TRIGL SERPL-MCNC: 126 MG/DL
TSH SERPL DL<=0.005 MIU/L-ACNC: 2.06 UIU/ML (ref 0.3–4.2)
UROBILINOGEN UR STRIP-ACNC: 0.2 E.U./DL
WBC # BLD AUTO: 4.4 10E3/UL (ref 4–11)

## 2023-02-03 PROCEDURE — 99214 OFFICE O/P EST MOD 30 MIN: CPT | Performed by: FAMILY MEDICINE

## 2023-02-03 PROCEDURE — 81003 URINALYSIS AUTO W/O SCOPE: CPT | Performed by: FAMILY MEDICINE

## 2023-02-03 PROCEDURE — 36415 COLL VENOUS BLD VENIPUNCTURE: CPT | Performed by: FAMILY MEDICINE

## 2023-02-03 PROCEDURE — 80050 GENERAL HEALTH PANEL: CPT | Performed by: FAMILY MEDICINE

## 2023-02-03 PROCEDURE — 80061 LIPID PANEL: CPT | Performed by: FAMILY MEDICINE

## 2023-02-03 PROCEDURE — 82306 VITAMIN D 25 HYDROXY: CPT | Performed by: FAMILY MEDICINE

## 2023-02-03 PROCEDURE — 83036 HEMOGLOBIN GLYCOSYLATED A1C: CPT | Performed by: FAMILY MEDICINE

## 2023-02-03 ASSESSMENT — PAIN SCALES - GENERAL: PAINLEVEL: NO PAIN (0)

## 2023-02-03 NOTE — PROGRESS NOTES
"  Assessment & Plan     Elevated blood sugar  discusses diet and weight loss  Increase physical activities.  - Hemoglobin A1c; Future  - Hemoglobin A1c    Vitamin D deficiency    - Comprehensive metabolic panel (BMP + Alb, Alk Phos, ALT, AST, Total. Bili, TP); Future  - Vitamin D Deficiency; Future  - Comprehensive metabolic panel (BMP + Alb, Alk Phos, ALT, AST, Total. Bili, TP)  - Vitamin D Deficiency    Other fatigue    - CBC with platelets and differential; Future  - TSH with free T4 reflex; Future  - Lipid panel reflex to direct LDL Fasting; Future  - CBC with platelets and differential  - TSH with free T4 reflex  - Lipid panel reflex to direct LDL Fasting    Urinary frequency  Negative for UA  - UA Macro with Reflex to Micro and Culture - lab collect; Future  - UA Macro with Reflex to Micro and Culture - lab collect    Right Ankle Sprain,  Getting better, advised with RICE therapy.  Advised patient to avoid any running or jogging for the next 1 to 2 weeks or until completely heals       BMI:   Estimated body mass index is 26.91 kg/m  as calculated from the following:    Height as of this encounter: 1.727 m (5' 8\").    Weight as of this encounter: 80.3 kg (177 lb).   Weight management plan: Discussed healthy diet and exercise guidelines    Work on weight loss  Regular exercise    Return in about 1 year (around 2/3/2024) for Routine preventive, in person.      Maribel Vazquez is a 37 year old presenting for the following health issues:    Patient reports for the past week or so he has been having more frequency, more urgency.  He denies pain with urination, no blood in his urine.  No low back pain, no abdominal pain, no nausea no vomiting.  Has no fever no chills.  No history or family history of diabetes.  He reports he has been feeling fatigued, tired.  No weight loss or gain.  No lower extremity edema.  Denies headache, denies being lightheaded or dizzy.    Patient reports a week ago he injured his right " "ankle playing soccer, it was twisted.  Its been getting better, he is able to walk and put weight and pressure on it.    No weakness, no numbness or tingling in his toes.    History of Present Illness       Reason for visit:  Yearly check up    He eats 2-3 servings of fruits and vegetables daily.He consumes 0 sweetened beverage(s) daily.He exercises with enough effort to increase his heart rate 20 to 29 minutes per day.  He exercises with enough effort to increase his heart rate 3 or less days per week.   He is taking medications regularly.       Genitourinary - Male  Onset/Duration: three mnths ago  Description:   Dysuria (painful urination): No}  Hematuria (blood in urine): No  Frequency: YES  Waking at night to urinate: once in awhile  Hesitancy (delay in urine): No  Retention (unable to empty): No  Decrease in urinary flow: No  Incontinence: No  Progression of Symptoms:  worsening  Accompanying Signs & Symptoms:  Fever: No  Back/Flank pain: No  Urethral discharge: No  Testicle lumps/masses/pain: No  Nausea and/or vomiting: No  Abdominal pain: No  History:   History of frequent UTI s: No  History of kidney stones: No  History of hernias: No  Personal or Family history of Prostate problems: No  Sexually active: YES  Precipitating or alleviating factors: None  Therapies tried and outcome: none      Review of Systems   Constitutional, HEENT, cardiovascular, pulmonary, GI, , musculoskeletal, neuro, skin, endocrine and psych systems are negative, except as otherwise noted.      Objective    BP (!) 86/68 (BP Location: Right arm, Patient Position: Chair, Cuff Size: Adult Regular)   Pulse 58   Temp 98.4  F (36.9  C) (Oral)   Resp 18   Ht 1.727 m (5' 8\")   Wt 80.3 kg (177 lb)   SpO2 100%   BMI 26.91 kg/m    Body mass index is 26.91 kg/m .  Physical Exam   GENERAL: healthy, alert and no distress  HENT: ear canals and TM's normal, nose and mouth without ulcers or lesions  NECK: no adenopathy, no asymmetry, masses, " or scars and thyroid normal to palpation  RESP: lungs clear to auscultation - no rales, rhonchi or wheezes  CV: regular rate and rhythm, normal S1 S2, no S3 or S4, no murmur, click or rub, no peripheral edema and peripheral pulses strong  ABDOMEN: soft, nontender, no hepatosplenomegaly, no masses and bowel sounds normal  MS: Right ankle exam:  Mild swelling at the lateral malleolus area.  Otherwise, normal exam.  Full range of motion, full strength, good capillary refills, no weakness, no tenderness.  SKIN: no suspicious lesions or rashes  NEURO: Normal strength and tone, mentation intact and speech normal  PSYCH: mentation appears normal, affect normal/bright    Orders Placed This Encounter   Procedures     UA Macro with Reflex to Micro and Culture - lab collect     Comprehensive metabolic panel (BMP + Alb, Alk Phos, ALT, AST, Total. Bili, TP)     Vitamin D Deficiency     TSH with free T4 reflex     Hemoglobin A1c     Lipid panel reflex to direct LDL Fasting     CBC with platelets and differential     CBC with platelets and differential     Lab Results   Component Value Date    A1C 5.6 02/03/2023     UA RESULTS:  Recent Labs   Lab Test 02/03/23  0908   COLOR Yellow   APPEARANCE Clear   URINEGLC Negative   URINEBILI Negative   URINEKETONE Negative   SG 1.025   UBLD Negative   URINEPH 7.0   PROTEIN Negative   UROBILINOGEN 0.2   NITRITE Negative   LEUKEST Negative       Daisy Haley MD

## 2023-02-04 LAB — DEPRECATED CALCIDIOL+CALCIFEROL SERPL-MC: 29 UG/L (ref 20–75)

## 2023-07-30 ENCOUNTER — HEALTH MAINTENANCE LETTER (OUTPATIENT)
Age: 38
End: 2023-07-30

## 2024-01-04 ENCOUNTER — OFFICE VISIT (OUTPATIENT)
Dept: FAMILY MEDICINE | Facility: CLINIC | Age: 39
End: 2024-01-04

## 2024-01-04 VITALS
DIASTOLIC BLOOD PRESSURE: 76 MMHG | SYSTOLIC BLOOD PRESSURE: 118 MMHG | RESPIRATION RATE: 22 BRPM | WEIGHT: 180 LBS | HEIGHT: 68 IN | HEART RATE: 68 BPM | TEMPERATURE: 98 F | BODY MASS INDEX: 27.28 KG/M2 | OXYGEN SATURATION: 100 %

## 2024-01-04 DIAGNOSIS — K64.4 EXTERNAL HEMORRHOIDS: ICD-10-CM

## 2024-01-04 DIAGNOSIS — Z00.00 ROUTINE GENERAL MEDICAL EXAMINATION AT A HEALTH CARE FACILITY: Primary | ICD-10-CM

## 2024-01-04 DIAGNOSIS — R73.9 ELEVATED BLOOD SUGAR: ICD-10-CM

## 2024-01-04 DIAGNOSIS — E55.9 VITAMIN D DEFICIENCY: ICD-10-CM

## 2024-01-04 DIAGNOSIS — K21.9 GASTROESOPHAGEAL REFLUX DISEASE WITHOUT ESOPHAGITIS: ICD-10-CM

## 2024-01-04 LAB
BASOPHILS # BLD AUTO: 0 10E3/UL (ref 0–0.2)
BASOPHILS NFR BLD AUTO: 1 %
EOSINOPHIL # BLD AUTO: 0.1 10E3/UL (ref 0–0.7)
EOSINOPHIL NFR BLD AUTO: 2 %
ERYTHROCYTE [DISTWIDTH] IN BLOOD BY AUTOMATED COUNT: 11.9 % (ref 10–15)
HBA1C MFR BLD: 5.2 % (ref 0–5.6)
HCT VFR BLD AUTO: 43.7 % (ref 40–53)
HGB BLD-MCNC: 14.5 G/DL (ref 13.3–17.7)
IMM GRANULOCYTES # BLD: 0 10E3/UL
IMM GRANULOCYTES NFR BLD: 0 %
LYMPHOCYTES # BLD AUTO: 1.9 10E3/UL (ref 0.8–5.3)
LYMPHOCYTES NFR BLD AUTO: 45 %
MCH RBC QN AUTO: 28.8 PG (ref 26.5–33)
MCHC RBC AUTO-ENTMCNC: 33.2 G/DL (ref 31.5–36.5)
MCV RBC AUTO: 87 FL (ref 78–100)
MONOCYTES # BLD AUTO: 0.5 10E3/UL (ref 0–1.3)
MONOCYTES NFR BLD AUTO: 12 %
NEUTROPHILS # BLD AUTO: 1.7 10E3/UL (ref 1.6–8.3)
NEUTROPHILS NFR BLD AUTO: 40 %
PLATELET # BLD AUTO: 235 10E3/UL (ref 150–450)
RBC # BLD AUTO: 5.04 10E6/UL (ref 4.4–5.9)
WBC # BLD AUTO: 4.2 10E3/UL (ref 4–11)

## 2024-01-04 PROCEDURE — 85025 COMPLETE CBC W/AUTO DIFF WBC: CPT | Performed by: FAMILY MEDICINE

## 2024-01-04 PROCEDURE — 80061 LIPID PANEL: CPT | Performed by: FAMILY MEDICINE

## 2024-01-04 PROCEDURE — 83036 HEMOGLOBIN GLYCOSYLATED A1C: CPT | Performed by: FAMILY MEDICINE

## 2024-01-04 PROCEDURE — 82306 VITAMIN D 25 HYDROXY: CPT | Performed by: FAMILY MEDICINE

## 2024-01-04 PROCEDURE — 36415 COLL VENOUS BLD VENIPUNCTURE: CPT | Performed by: FAMILY MEDICINE

## 2024-01-04 PROCEDURE — 99395 PREV VISIT EST AGE 18-39: CPT | Performed by: FAMILY MEDICINE

## 2024-01-04 PROCEDURE — 80053 COMPREHEN METABOLIC PANEL: CPT | Performed by: FAMILY MEDICINE

## 2024-01-04 RX ORDER — CHOLECALCIFEROL (VITAMIN D3) 50 MCG
1 TABLET ORAL DAILY
Qty: 90 TABLET | Refills: 3 | Status: SHIPPED | OUTPATIENT
Start: 2024-01-04 | End: 2024-09-12

## 2024-01-04 RX ORDER — HYDROCORTISONE 25 MG/G
CREAM TOPICAL
Qty: 30 G | Refills: 3 | Status: SHIPPED | OUTPATIENT
Start: 2024-01-04 | End: 2024-09-12

## 2024-01-04 ASSESSMENT — ENCOUNTER SYMPTOMS
COUGH: 0
PARESTHESIAS: 0
HEADACHES: 0
NAUSEA: 0
HEMATOLOGIC/LYMPHATIC NEGATIVE: 1
FREQUENCY: 0
HEARTBURN: 0
HEMATURIA: 0
FEVER: 0
ENDOCRINE NEGATIVE: 1
ARTHRALGIAS: 0
CHILLS: 0
MYALGIAS: 0
DYSURIA: 0
HEMATOCHEZIA: 0
PALPITATIONS: 0
ALLERGIC/IMMUNOLOGIC NEGATIVE: 1
DIARRHEA: 0
JOINT SWELLING: 0
ABDOMINAL PAIN: 0
NERVOUS/ANXIOUS: 0
DIZZINESS: 0
SORE THROAT: 0
EYE PAIN: 0
CONSTIPATION: 0
SHORTNESS OF BREATH: 0
WEAKNESS: 0

## 2024-01-04 ASSESSMENT — PAIN SCALES - GENERAL: PAINLEVEL: NO PAIN (0)

## 2024-01-04 NOTE — PROGRESS NOTES
"SUBJECTIVE:   George is a 38 year old, presenting for the following:  Physical  Comes for an annual exam, history of vitamin D deficiency, history of external hemorrhoid.  He has no bleeding,    History of acid reflux,    Declined Vaccinations.        1/4/2024    11:01 AM   Additional Questions   Roomed by Micha DEMARCO CMA   Accompanied by N/A         1/4/2024    11:01 AM   Patient Reported Additional Medications   Patient reports taking the following new medications None       Healthy Habits:     Getting at least 3 servings of Calcium per day:  Yes    Bi-annual eye exam:  NO    Dental care twice a year:  NO    Sleep apnea or symptoms of sleep apnea:  None    Diet:  Regular (no restrictions)    Frequency of exercise:  2-3 days/week    Duration of exercise:  15-30 minutes    Taking medications regularly:  Yes    Barriers to taking medications:  None    Medication side effects:  Not applicable    Additional concerns today:  No      Social History     Tobacco Use    Smoking status: Never     Passive exposure: Never    Smokeless tobacco: Never   Substance Use Topics    Alcohol use: No             1/4/2024    10:53 AM   Alcohol Use   Prescreen: >3 drinks/day or >7 drinks/week? No          No data to display                Last PSA: No results found for: \"PSA\"    Reviewed orders with patient. Reviewed health maintenance and updated orders accordingly - Yes  Lab work is in process  Labs reviewed in EPIC    Reviewed and updated as needed this visit by clinical staff   Tobacco  Allergies  Meds   Med Hx  Surg Hx  Fam Hx  Soc Hx        Reviewed and updated as needed this visit by Provider                 History reviewed. No pertinent past medical history.   Past Surgical History:   Procedure Laterality Date    NO HISTORY OF SURGERY       OB History   No obstetric history on file.       Review of Systems   Constitutional:  Negative for chills and fever.   HENT:  Negative for congestion, ear pain, hearing loss and sore " "throat.    Eyes:  Negative for pain and visual disturbance.   Respiratory:  Negative for cough and shortness of breath.    Cardiovascular:  Negative for chest pain, palpitations and peripheral edema.   Gastrointestinal:  Negative for abdominal pain, constipation, diarrhea, heartburn, hematochezia and nausea.   Endocrine: Negative.    Genitourinary:  Negative for dysuria, frequency, genital sores, hematuria, impotence, penile discharge and urgency.   Musculoskeletal:  Negative for arthralgias, joint swelling and myalgias.   Skin:  Negative for rash.   Allergic/Immunologic: Negative.    Neurological:  Negative for dizziness, weakness, headaches and paresthesias.   Hematological: Negative.    Psychiatric/Behavioral:  Negative for mood changes. The patient is not nervous/anxious.      CONSTITUTIONAL: NEGATIVE for fever, chills, change in weight  INTEGUMENTARY/SKIN: NEGATIVE for worrisome rashes, moles or lesions  EYES: NEGATIVE for vision changes or irritation  ENT: NEGATIVE for ear, mouth and throat problems  RESP: NEGATIVE for significant cough or SOB  CV: NEGATIVE for chest pain, palpitations or peripheral edema  GI: NEGATIVE for nausea, abdominal pain, heartburn, or change in bowel habits   male: negative for dysuria, hematuria, decreased urinary stream, erectile dysfunction, urethral discharge  MUSCULOSKELETAL: NEGATIVE for significant arthralgias or myalgia  NEURO: NEGATIVE for weakness, dizziness or paresthesias  ENDOCRINE: NEGATIVE for temperature intolerance, skin/hair changes  HEME/ALLERGY/IMMUNE: NEGATIVE for bleeding problems  PSYCHIATRIC: NEGATIVE for changes in mood or affect    OBJECTIVE:   /76 (BP Location: Right arm, Patient Position: Chair, Cuff Size: Adult Large)   Pulse 68   Temp 98  F (36.7  C) (Oral)   Resp 22   Ht 1.72 m (5' 7.72\")   Wt 81.6 kg (180 lb)   SpO2 100%   BMI 27.60 kg/m      Physical Exam  GENERAL: healthy, alert and no distress  EYES: Eyes grossly normal to inspection, " PERRL and conjunctivae and sclerae normal  HENT: ear canals and TM's normal, nose and mouth without ulcers or lesions  NECK: no adenopathy, no asymmetry, masses, or scars and thyroid normal to palpation  RESP: lungs clear to auscultation - no rales, rhonchi or wheezes  CV: regular rate and rhythm, normal S1 S2, no S3 or S4, no murmur, click or rub, no peripheral edema and peripheral pulses strong  ABDOMEN: soft, nontender, no hepatosplenomegaly, no masses and bowel sounds normal  RECTAL: Positive for external hemorrhoid, non thrombosed, at 4 O'clock position.  MS: no gross musculoskeletal defects noted, no edema  SKIN: no suspicious lesions or rashes  NEURO: Normal strength and tone, mentation intact and speech normal  PSYCH: mentation appears normal, affect normal/bright    Diagnostic Test Results:  Labs reviewed in Epic  Orders Placed This Encounter   Procedures    REVIEW OF HEALTH MAINTENANCE PROTOCOL ORDERS    Lipid panel reflex to direct LDL Fasting    Comprehensive metabolic panel (BMP + Alb, Alk Phos, ALT, AST, Total. Bili, TP)    Hemoglobin A1c    Vitamin D Deficiency    Helicobacter pylori Antigen Stool    CBC with platelets and differential    CBC with platelets and differential        ASSESSMENT/PLAN:   (Z00.00) Routine general medical examination at a health care facility  (primary encounter diagnosis)  Comment:   Plan: Lipid panel reflex to direct LDL Fasting,         Comprehensive metabolic panel (BMP + Alb, Alk         Phos, ALT, AST, Total. Bili, TP), CBC with         platelets and differential         Discussed diet, exercise, wellness and other preventive recommendations related to health maintenance.   Follow up as needed for acute issues.    Physical exam recommended in one year.     (R73.9) Elevated blood sugar  Comment:   Plan: Hemoglobin A1c        Discussed diet and weight loss  Increase physical activities.    (E55.9) Vitamin D deficiency  Comment:   Plan: Vitamin D Deficiency, vitamin D3     "     (CHOLECALCIFEROL) 50 mcg (2000 units) tablet            (K64.4) External hemorrhoids  Comment:   Plan: hydrocortisone, Perianal, (HYDROCORTISONE) 2.5         % cream        Diet modifications, increase fiber and fluid intake  Avoid constipation, avoid straining.    (K21.9) Gastroesophageal reflux disease without esophagitis  Comment:   Plan: Helicobacter pylori Antigen Stool        Diet modifications.    Patient has been advised of split billing requirements and indicates understanding: Yes      COUNSELING:   Reviewed preventive health counseling, as reflected in patient instructions       Regular exercise       Healthy diet/nutrition       Immunizations  Declined: Covid-19 and Influenza due to Other time          BMI:   Estimated body mass index is 27.6 kg/m  as calculated from the following:    Height as of this encounter: 1.72 m (5' 7.72\").    Weight as of this encounter: 81.6 kg (180 lb).   Weight management plan: Discussed healthy diet and exercise guidelines      He reports that he has never smoked. He has never been exposed to tobacco smoke. He has never used smokeless tobacco.            Daisy aHley MD  [facility]  "

## 2024-01-05 LAB
ALBUMIN SERPL BCG-MCNC: 4.7 G/DL (ref 3.5–5.2)
ALP SERPL-CCNC: 85 U/L (ref 40–150)
ALT SERPL W P-5'-P-CCNC: 35 U/L (ref 0–70)
ANION GAP SERPL CALCULATED.3IONS-SCNC: 9 MMOL/L (ref 7–15)
AST SERPL W P-5'-P-CCNC: 23 U/L (ref 0–45)
BILIRUB SERPL-MCNC: 0.3 MG/DL
BUN SERPL-MCNC: 9 MG/DL (ref 6–20)
CALCIUM SERPL-MCNC: 9.2 MG/DL (ref 8.6–10)
CHLORIDE SERPL-SCNC: 102 MMOL/L (ref 98–107)
CHOLEST SERPL-MCNC: 200 MG/DL
CREAT SERPL-MCNC: 0.78 MG/DL (ref 0.67–1.17)
DEPRECATED HCO3 PLAS-SCNC: 30 MMOL/L (ref 22–29)
EGFRCR SERPLBLD CKD-EPI 2021: >90 ML/MIN/1.73M2
FASTING STATUS PATIENT QL REPORTED: ABNORMAL
GLUCOSE SERPL-MCNC: 80 MG/DL (ref 70–99)
HDLC SERPL-MCNC: 44 MG/DL
LDLC SERPL CALC-MCNC: 107 MG/DL
NONHDLC SERPL-MCNC: 156 MG/DL
POTASSIUM SERPL-SCNC: 4 MMOL/L (ref 3.4–5.3)
PROT SERPL-MCNC: 7.7 G/DL (ref 6.4–8.3)
SODIUM SERPL-SCNC: 141 MMOL/L (ref 135–145)
TRIGL SERPL-MCNC: 247 MG/DL
VIT D+METAB SERPL-MCNC: 29 NG/ML (ref 20–50)

## 2024-02-01 ENCOUNTER — TELEPHONE (OUTPATIENT)
Dept: FAMILY MEDICINE | Facility: CLINIC | Age: 39
End: 2024-02-01

## 2024-02-01 ENCOUNTER — MYC MEDICAL ADVICE (OUTPATIENT)
Dept: FAMILY MEDICINE | Facility: CLINIC | Age: 39
End: 2024-02-01

## 2024-02-01 DIAGNOSIS — K64.4 EXTERNAL HEMORRHOIDS: Primary | ICD-10-CM

## 2024-02-01 NOTE — TELEPHONE ENCOUNTER
Routing to Dr Haley    Patient requesting referral for Hemorrhoid    Pended if agreeable    Office visit 1/4    RECTAL: Positive for external hemorrhoid, non thrombosed, at 4 O'clock position.         Catrachito Oleary, RN, BSN, PHN  [facility]

## 2024-02-01 NOTE — TELEPHONE ENCOUNTER
RN replied to patient via [employer]. See message for details.     Catrachito Oleary RN, BSN, PHN  [facility]: [place]

## 2024-02-01 NOTE — TELEPHONE ENCOUNTER
RN left  for patient requesting call back to clinic.    RN called to advise referral had been placed.    Catrachito Oleary RN, BSN, PHN  [facility]

## 2024-02-01 NOTE — TELEPHONE ENCOUNTER
Order/Referral Request    Who is requesting: patient    Orders being requested: General surgery referral     Reason service is needed/diagnosis: Hemorid's     When are orders needed by: asap    Has this been discussed with Provider: Yes    Does patient have a preference on a Group/Provider/Facility? no    Does patient have an appointment scheduled?: No    Where to send orders: Place orders within Epic    Could we send this information to you in [facility] or would you prefer to receive a phone call?:   Patient would prefer a phone call   Okay to leave a detailed message?: Yes at Home number on file [phone] (home)

## 2024-02-12 NOTE — TELEPHONE ENCOUNTER
Diagnosis, Referred by & from: External Hemorrhoids   Appt date: 4/18/2024   NOTES STATUS DETAILS   OFFICE NOTE from referring provider Internal [facility]:  1/4/24, 5/24/22 - PCC OV with Dr. Haley   OFFICE NOTE from other specialist Internal [facility]:  9/9/21 - GEN SURG OV with Dr. Archibald   DISCHARGE SUMMARY from hospital N/A    DISCHARGE REPORT from the ER N/A    OPERATIVE REPORT N/A    MEDICATION LIST Internal    LABS N/A    DIAGNOSTIC PROCEDURES N/A    IMAGING (DISC & REPORT) N/A

## 2024-04-17 NOTE — TELEPHONE ENCOUNTER
Diagnosis, Referred by & from: External Hemorrhoids   Appt date: 7/8/2024   NOTES STATUS DETAILS   OFFICE NOTE from referring provider Internal [facility]:  1/4/24, 5/24/22 - PCC OV with Dr. Haley   OFFICE NOTE from other specialist Internal [facility]:  9/9/21 - GEN SURG OV with Dr. Archibald   DISCHARGE SUMMARY from hospital N/A    DISCHARGE REPORT from the ER N/A    OPERATIVE REPORT N/A    MEDICATION LIST Internal    LABS N/A    DIAGNOSTIC PROCEDURES N/A    IMAGING (DISC & REPORT) N/A

## 2024-04-18 ENCOUNTER — PRE VISIT (OUTPATIENT)
Dept: SURGERY | Facility: CLINIC | Age: 39
End: 2024-04-18

## 2024-07-08 ENCOUNTER — PRE VISIT (OUTPATIENT)
Dept: SURGERY | Facility: CLINIC | Age: 39
End: 2024-07-08

## 2024-07-08 NOTE — TELEPHONE ENCOUNTER
Diagnosis, Referred by & from: External Hemorrhoids   Appt date: 9/19/2024   NOTES STATUS DETAILS   OFFICE NOTE from referring provider Internal [facility]:  1/4/24, 5/24/22 - PCC OV with Dr. Haley   OFFICE NOTE from other specialist Internal [facility]:  9/9/21 - GEN SURG OV with Dr. Archibald   DISCHARGE SUMMARY from hospital N/A    DISCHARGE REPORT from the ER N/A    OPERATIVE REPORT N/A    MEDICATION LIST Internal    LABS     ANAL PAP/CEA N/A    BIOPSIES/PATHOLOGY RELATED TO DIAGNOSIS N/A    DIAGNOSTIC PROCEDURES     PFC TESTING (from the Pelvic floor center includes Manometry, PDNL, EMG, etc.) N/A    COLONOSCOPY (most recent all time after 5 years) N/A    FLEX SIGMOIDOSCOPY N/A    UPPER ENDOSCOPY (EGD) N/A    ERCP N/A    IMAGING (DISC & REPORT)      CT N/A    MRI N/A    XRAY N/A    ULTRASOUND  (ENDOANAL/ENDORECTAL) N/A

## 2024-09-12 ENCOUNTER — OFFICE VISIT (OUTPATIENT)
Dept: FAMILY MEDICINE | Facility: CLINIC | Age: 39
End: 2024-09-12
Payer: MEDICAID

## 2024-09-12 VITALS
RESPIRATION RATE: 15 BRPM | HEART RATE: 60 BPM | DIASTOLIC BLOOD PRESSURE: 75 MMHG | WEIGHT: 170.2 LBS | BODY MASS INDEX: 25.79 KG/M2 | OXYGEN SATURATION: 99 % | TEMPERATURE: 98.6 F | HEIGHT: 68 IN | SYSTOLIC BLOOD PRESSURE: 115 MMHG

## 2024-09-12 DIAGNOSIS — R06.89 YAWNING: ICD-10-CM

## 2024-09-12 DIAGNOSIS — E78.2 MIXED HYPERLIPIDEMIA: Primary | ICD-10-CM

## 2024-09-12 DIAGNOSIS — E55.9 VITAMIN D DEFICIENCY: ICD-10-CM

## 2024-09-12 PROCEDURE — 99213 OFFICE O/P EST LOW 20 MIN: CPT | Performed by: FAMILY MEDICINE

## 2024-09-12 RX ORDER — CHOLECALCIFEROL (VITAMIN D3) 50 MCG
1 TABLET ORAL DAILY
Qty: 90 TABLET | Refills: 3 | Status: SHIPPED | OUTPATIENT
Start: 2024-09-12 | End: 2024-09-13

## 2024-09-12 RX ORDER — CHOLECALCIFEROL (VITAMIN D3) 50 MCG
1 TABLET ORAL DAILY
Qty: 90 TABLET | Refills: 3 | Status: SHIPPED | OUTPATIENT
Start: 2024-09-12 | End: 2024-09-12

## 2024-09-12 NOTE — PROGRESS NOTES
"  Assessment & Plan     Vitamin D deficiency  Continue with current medicine.  - vitamin D3 (CHOLECALCIFEROL) 50 mcg (2000 units) tablet; Take 1 tablet (50 mcg) by mouth daily.    Mixed hyperlipidemia  Discussed diet modifications  Patient reports he has been more physically active, he has been watching his diet.  He had lost almost 10 pounds since his last visit January of this year.    Yawning  Feels more after he eats or chew gum.  No other symptoms.  Discussed with patient could be related to swallowing air.  Advised patient to avoid taking big bites, avoid eating fast,  Advised to eat slowly, take small bites, chew slowly.          BMI  Estimated body mass index is 26.1 kg/m  as calculated from the following:    Height as of this encounter: 1.72 m (5' 7.72\").    Weight as of this encounter: 77.2 kg (170 lb 3.2 oz).   Weight management plan: Discussed healthy diet and exercise guidelines      Work on weight loss  Regular exercise    Maribel Vazquez is a 39 year old, presenting for the following health issues:  Yawning Incomplete and Lipids          9/12/2024     2:13 PM   Additional Questions   Roomed by london blanca ma     History of Present Illness       Reason for visit:  Uncompleted yawning and to xheck my cholesterol level  Symptom onset:  More than a month  Symptom intensity:  Moderate  Symptom progression:  Staying the same  Had these symptoms before:  No  What makes it worse:  I have to force the yawning sometimes.  What makes it better:  To check up everything.   He is taking medications regularly.     Per patient when he feels as if he needs to yawn he cannot yawn and forces himself to yawn instead.          Review of Systems  Constitutional, HEENT, cardiovascular, pulmonary, GI, , musculoskeletal, neuro, skin, endocrine and psych systems are negative, except as otherwise noted.      Objective    /75 (BP Location: Right arm, Patient Position: Chair, Cuff Size: Adult Regular)   Pulse 60   " "Temp 98.6  F (37  C) (Oral)   Resp 15   Ht 1.72 m (5' 7.72\")   Wt 77.2 kg (170 lb 3.2 oz)   SpO2 99%   BMI 26.10 kg/m    Body mass index is 26.1 kg/m .  Physical Exam   GENERAL: alert and no distress  CV: regular rate and rhythm, normal S1 S2, no S3 or S4, no murmur, click or rub, no peripheral edema  ABDOMEN: soft, nontender, no hepatosplenomegaly, no masses and bowel sounds normal  PSYCH: mentation appears normal, affect normal/bright    No orders of the defined types were placed in this encounter.           Signed Electronically by: Daisy Haley MD    "

## 2024-09-13 RX ORDER — CHOLECALCIFEROL (VITAMIN D3) 50 MCG
1 TABLET ORAL DAILY
Qty: 90 TABLET | Refills: 3 | Status: SHIPPED | OUTPATIENT
Start: 2024-09-13

## 2024-09-19 ENCOUNTER — PRE VISIT (OUTPATIENT)
Dept: SURGERY | Facility: CLINIC | Age: 39
End: 2024-09-19

## 2024-09-19 ENCOUNTER — OFFICE VISIT (OUTPATIENT)
Dept: SURGERY | Facility: CLINIC | Age: 39
End: 2024-09-19
Attending: FAMILY MEDICINE
Payer: MEDICAID

## 2024-09-19 VITALS
OXYGEN SATURATION: 100 % | WEIGHT: 168.9 LBS | HEIGHT: 68 IN | HEART RATE: 65 BPM | BODY MASS INDEX: 25.6 KG/M2 | DIASTOLIC BLOOD PRESSURE: 55 MMHG | SYSTOLIC BLOOD PRESSURE: 108 MMHG

## 2024-09-19 DIAGNOSIS — K60.2 ANAL FISSURE: Primary | ICD-10-CM

## 2024-09-19 DIAGNOSIS — K64.4 EXTERNAL HEMORRHOIDS: ICD-10-CM

## 2024-09-19 PROCEDURE — 99203 OFFICE O/P NEW LOW 30 MIN: CPT | Performed by: NURSE PRACTITIONER

## 2024-09-19 ASSESSMENT — PAIN SCALES - GENERAL: PAINLEVEL: NO PAIN (0)

## 2024-09-19 NOTE — PROGRESS NOTES
"Colon and Rectal Surgery Consult Clinic Note    Date: 2024     Referring provider:  Daisy Haley MD  [address] [id number]     RE: George Morgan  : 1985  [place]: 2024    George Morgan is a very pleasant 39 year old male here for hemorrhoids.    HPI:  Had infrared coagulation of hemorrhoids in February in [place]. This helped his symptoms but he was told he needed laser surgery for the external hemorrhoids. No bleeding. Has a little pressure and pain with bowel movement. No bleeding. Bowel movements are normal and soft with increased dietary fiber. No tissue that needs to be manually reduced.    Physical Examination: Exam was chaperoned by JERILYN Shi   /55 (BP Location: Left arm, Patient Position: Sitting, Cuff Size: Adult Regular)   Pulse 65   Ht 5' 7.72\"   Wt 168 lb 14.4 oz   SpO2 100%   BMI 25.89 kg/m    General: alert, oriented, in no acute distress, sitting comfortably  HEENT: mucous membranes moist    Perianal external examination:  Perianal skin: Intact with no excoriation or lichenification.  Lesions: No evidence of an external lesion, nodularity, or induration in the perianal region.  Eversion of buttocks: There was evidence of an anal fissure. Details: posterior midline anal fissure on eversion of buttocks.  Skin tags or external hemorrhoids: Yes: skin tag in the posterior position.    Digital rectal examination: Was deferred in order not to cause further trauma    Anoscopy: Was deferred in order not to cause further trauma    Assessment/Plan: 39 year old male with anal fissure. I discussed that this is likely the source of the pain and bleeding. Discussed the importance of keeping stools soft and easy to pass while healing fissure.  Recommended starting on a daily fiber supplement and can add in a laxative in addition as needed.  Will treat with topical diltiazem with follow up in 8 weeks. Discussed that it may take several weeks for symptoms to " "improve. If no improvement is noted after 4 weeks, return to clinic and discuss further intervention such as Botox injections.  Advised the use of Tylenol, ibuprofen, and warm tub baths for any pain.  Patient's questions were answered to his stated satisfaction and he is in agreement with this plan.       Medical history:  No past medical history on file.    Surgical history:  Past Surgical History:   Procedure Laterality Date    NO HISTORY OF SURGERY         Problem list:    Patient Active Problem List    Diagnosis Date Noted    Mixed hyperlipidemia 09/12/2024     Priority: Medium    Flexural eczema 03/22/2021     Priority: Medium       Medications:  Current Outpatient Medications   Medication Sig Dispense Refill    vitamin D3 (CHOLECALCIFEROL) 50 mcg (2000 units) tablet Take 1 tablet (50 mcg) by mouth daily. 90 tablet 3       Allergies:  No Known Allergies    Family history:  Family History   Problem Relation Age of Onset    Arthritis Mother     Hyperlipidemia Mother     Other - See Comments Father         pt was very young, possible complications of diabetes    Diabetes No family hx of     Hypertension No family hx of        Social history:  Social History     Tobacco Use    Smoking status: Never     Passive exposure: Never    Smokeless tobacco: Never   Substance Use Topics    Alcohol use: No    Marital status: .      Nursing Notes:   Kassie Mishra  9/19/2024  2:13 PM  Signed  Chief Complaint   Patient presents with    Consult     External hemorrhoids       Vitals:    09/19/24 1410   BP: 108/55   BP Location: Left arm   Patient Position: Sitting   Cuff Size: Adult Regular   Pulse: 65   SpO2: 100%   Weight: 168 lb 14.4 oz   Height: 5' 7.72\"       Body mass index is 25.89 kg/m .    JERILYN Shi     30 minutes spent on the date of encounter performing chart review, history and exam, documentation and further activities as noted above     MINDI Lyles, NP-C  Colon and Rectal Surgery "   [facility]    This note was created using speech recognition software and may contain unintended word substitutions.

## 2024-09-19 NOTE — LETTER
"2024       RE: George Morgan  [address] [place] [address]     Dear Colleague,    Thank you for referring your patient, George Morgan, to the [facility] COLON AND RECTAL SURGERY CLINIC [place] [facility]. Please see a copy of my visit note below.    Colon and Rectal Surgery Consult Clinic Note    Date: 2024     Referring provider:  Daisy Haley MD  [facility] [place] [address]     RE: George Morgan  : 1985  [place]: 2024    George Morgan is a very pleasant 39 year old male here for hemorrhoids.    HPI:  Had infrared coagulation of hemorrhoids in February in [place]. This helped his symptoms but he was told he needed laser surgery for the external hemorrhoids. No bleeding. Has a little pressure and pain with bowel movement. No bleeding. Bowel movements are normal and soft with increased dietary fiber. No tissue that needs to be manually reduced.    Physical Examination: Exam was chaperoned by Kassie Mishra EMT   /55 (BP Location: Left arm, Patient Position: Sitting, Cuff Size: Adult Regular)   Pulse 65   Ht 5' 7.72\"   Wt 168 lb 14.4 oz   SpO2 100%   BMI 25.89 kg/m    General: alert, oriented, in no acute distress, sitting comfortably  HEENT: mucous membranes moist    Perianal external examination:  Perianal skin: Intact with no excoriation or lichenification.  Lesions: No evidence of an external lesion, nodularity, or induration in the perianal region.  Eversion of buttocks: There was evidence of an anal fissure. Details: posterior midline anal fissure on eversion of buttocks.  Skin tags or external hemorrhoids: Yes: skin tag in the posterior position.    Digital rectal examination: Was deferred in order not to cause further trauma    Anoscopy: Was deferred in order not to cause further trauma    Assessment/Plan: 39 year old male with anal fissure. I discussed that this is likely the source of " the pain and bleeding. Discussed the importance of keeping stools soft and easy to pass while healing fissure.  Recommended starting on a daily fiber supplement and can add in a laxative in addition as needed.  Will treat with topical diltiazem with follow up in 8 weeks. Discussed that it may take several weeks for symptoms to improve. If no improvement is noted after 4 weeks, return to clinic and discuss further intervention such as Botox injections.  Advised the use of Tylenol, ibuprofen, and warm tub baths for any pain.  Patient's questions were answered to his stated satisfaction and he is in agreement with this plan.       Medical history:  No past medical history on file.    Surgical history:  Past Surgical History:   Procedure Laterality Date     NO HISTORY OF SURGERY         Problem list:    Patient Active Problem List    Diagnosis Date Noted     Mixed hyperlipidemia 09/12/2024     Priority: Medium     Flexural eczema 03/22/2021     Priority: Medium       Medications:  Current Outpatient Medications   Medication Sig Dispense Refill     vitamin D3 (CHOLECALCIFEROL) 50 mcg (2000 units) tablet Take 1 tablet (50 mcg) by mouth daily. 90 tablet 3       Allergies:  No Known Allergies    Family history:  Family History   Problem Relation Age of Onset     Arthritis Mother      Hyperlipidemia Mother      Other - See Comments Father         pt was very young, possible complications of diabetes     Diabetes No family hx of      Hypertension No family hx of        Social history:  Social History     Tobacco Use     Smoking status: Never     Passive exposure: Never     Smokeless tobacco: Never   Substance Use Topics     Alcohol use: No    Marital status: .      Nursing Notes:   Kassie Mishra  9/19/2024  2:13 PM  Signed  Chief Complaint   Patient presents with     Consult     External hemorrhoids       Vitals:    09/19/24 1410   BP: 108/55   BP Location: Left arm   Patient Position: Sitting   Cuff Size: Adult Regular  "  Pulse: 65   SpO2: 100%   Weight: 168 lb 14.4 oz   Height: 5' 7.72\"       Body mass index is 25.89 kg/m .    JERILYN Shi     30 minutes spent on the date of encounter performing chart review, history and exam, documentation and further activities as noted above     MINDI Lyles, NP-C  Colon and Rectal Surgery   [facility]    This note was created using speech recognition software and may contain unintended word substitutions.      Again, thank you for allowing me to participate in the care of your patient.      Sincerely,    MINDI Lyles CNP    "

## 2025-01-07 ENCOUNTER — OFFICE VISIT (OUTPATIENT)
Dept: FAMILY MEDICINE | Facility: CLINIC | Age: 40
End: 2025-01-07
Payer: COMMERCIAL

## 2025-01-07 VITALS
SYSTOLIC BLOOD PRESSURE: 113 MMHG | TEMPERATURE: 98.4 F | BODY MASS INDEX: 25.58 KG/M2 | DIASTOLIC BLOOD PRESSURE: 76 MMHG | WEIGHT: 168.8 LBS | HEART RATE: 54 BPM | HEIGHT: 68 IN | RESPIRATION RATE: 14 BRPM | OXYGEN SATURATION: 100 %

## 2025-01-07 DIAGNOSIS — E78.2 MIXED HYPERLIPIDEMIA: ICD-10-CM

## 2025-01-07 DIAGNOSIS — Z00.00 ENCOUNTER FOR ANNUAL PHYSICAL EXAM: Primary | ICD-10-CM

## 2025-01-07 DIAGNOSIS — K60.2 ANAL FISSURE: ICD-10-CM

## 2025-01-07 DIAGNOSIS — E55.9 VITAMIN D DEFICIENCY: ICD-10-CM

## 2025-01-07 LAB
ALBUMIN SERPL BCG-MCNC: 4.6 G/DL (ref 3.5–5.2)
ALP SERPL-CCNC: 85 U/L (ref 40–150)
ALT SERPL W P-5'-P-CCNC: 32 U/L (ref 0–70)
ANION GAP SERPL CALCULATED.3IONS-SCNC: 8 MMOL/L (ref 7–15)
AST SERPL W P-5'-P-CCNC: 26 U/L (ref 0–45)
BILIRUB SERPL-MCNC: 0.4 MG/DL
BUN SERPL-MCNC: 10.8 MG/DL (ref 6–20)
CALCIUM SERPL-MCNC: 9.2 MG/DL (ref 8.8–10.4)
CHLORIDE SERPL-SCNC: 101 MMOL/L (ref 98–107)
CHOLEST SERPL-MCNC: 223 MG/DL
CREAT SERPL-MCNC: 0.85 MG/DL (ref 0.67–1.17)
EGFRCR SERPLBLD CKD-EPI 2021: >90 ML/MIN/1.73M2
FASTING STATUS PATIENT QL REPORTED: YES
FASTING STATUS PATIENT QL REPORTED: YES
GLUCOSE SERPL-MCNC: 93 MG/DL (ref 70–99)
HCO3 SERPL-SCNC: 27 MMOL/L (ref 22–29)
HDLC SERPL-MCNC: 54 MG/DL
LDLC SERPL CALC-MCNC: 141 MG/DL
NONHDLC SERPL-MCNC: 169 MG/DL
POTASSIUM SERPL-SCNC: 4.4 MMOL/L (ref 3.4–5.3)
PROT SERPL-MCNC: 7.7 G/DL (ref 6.4–8.3)
SODIUM SERPL-SCNC: 136 MMOL/L (ref 135–145)
TRIGL SERPL-MCNC: 138 MG/DL
VIT D+METAB SERPL-MCNC: 36 NG/ML (ref 20–50)

## 2025-01-07 PROCEDURE — 82306 VITAMIN D 25 HYDROXY: CPT | Performed by: FAMILY MEDICINE

## 2025-01-07 PROCEDURE — 80053 COMPREHEN METABOLIC PANEL: CPT | Performed by: FAMILY MEDICINE

## 2025-01-07 PROCEDURE — 36415 COLL VENOUS BLD VENIPUNCTURE: CPT | Performed by: FAMILY MEDICINE

## 2025-01-07 PROCEDURE — 80061 LIPID PANEL: CPT | Performed by: FAMILY MEDICINE

## 2025-01-07 PROCEDURE — 99395 PREV VISIT EST AGE 18-39: CPT | Performed by: FAMILY MEDICINE

## 2025-01-07 RX ORDER — CHOLECALCIFEROL (VITAMIN D3) 50 MCG
1 TABLET ORAL DAILY
Qty: 90 TABLET | Refills: 3 | Status: SHIPPED | OUTPATIENT
Start: 2025-01-07

## 2025-01-07 SDOH — HEALTH STABILITY: PHYSICAL HEALTH: ON AVERAGE, HOW MANY DAYS PER WEEK DO YOU ENGAGE IN MODERATE TO STRENUOUS EXERCISE (LIKE A BRISK WALK)?: 5 DAYS

## 2025-01-07 ASSESSMENT — SOCIAL DETERMINANTS OF HEALTH (SDOH): HOW OFTEN DO YOU GET TOGETHER WITH FRIENDS OR RELATIVES?: ONCE A WEEK

## 2025-01-07 NOTE — PROGRESS NOTES
"Preventive Care Visit  [facility]  Daisy Haley MD, Family Medicine  Jan 7, 2025      Assessment & Plan     Encounter for annual physical exam   Discussed diet, exercise, wellness and other preventive recommendations related to health maintenance.   Follow up as needed for acute issues.    Physical exam recommended in one year.     Declined vaccines ( influenza and COVID booster )    Mixed hyperlipidemia  Discussed diet modifications  Increase physical activities  - Lipid panel reflex to direct LDL Non-fasting; Future  - Comprehensive metabolic panel (BMP + Alb, Alk Phos, ALT, AST, Total. Bili, TP); Future    Vitamin D deficiency    - Vitamin D Deficiency; Future  - vitamin D3 (CHOLECALCIFEROL) 50 mcg (2000 units) tablet; Take 1 tablet (50 mcg) by mouth daily.    Patient has been advised of split billing requirements and indicates understanding: Yes        Counseling  Appropriate preventive services were addressed with this patient via screening, questionnaire, or discussion as appropriate for fall prevention, nutrition, physical activity, Tobacco-use cessation, social engagement, weight loss and cognition.  Checklist reviewing preventive services available has been given to the patient.  Reviewed patient's diet, addressing concerns and/or questions.   The patient was instructed to see the dentist every 6 months.       Work on weight loss  Regular exercise    Maribel Vazquez is a 39 year old, presenting for the following:  Physical  Comes for annual exam.    History of Anal fissure, been seen by Rectal surgery, started on topical diltiazem.   Been doing well.        1/7/2025    11:11 AM   Additional Questions   Roomed by london blanca ma   Accompanied by self         1/7/2025    11:11 AM   Patient Reported Additional Medications   Patient reports taking the following new medications none          HPI      Per patient for a few months now sometimes he \"can't complete breathing\" and " trying to yawn but no chest pain/discomfort.    Health Care Directive  Patient does not have a Health Care Directive: Discussed advance care planning with patient; however, patient declined at this time.      1/7/2025   General Health   How would you rate your overall physical health? Excellent   Feel stress (tense, anxious, or unable to sleep) Not at all         1/7/2025   Nutrition   Three or more servings of calcium each day? Yes   Diet: Regular (no restrictions)   How many servings of fruit and vegetables per day? (!) 2-3   How many sweetened beverages each day? 0-1         1/7/2025   Exercise   Days per week of moderate/strenous exercise 5 days         1/7/2025   Social Factors   Frequency of gathering with friends or relatives Once a week   Worry food won't last until get money to buy more No   Food not last or not have enough money for food? No   Do you have housing? (Housing is defined as stable permanent housing and does not include staying ouside in a car, in a tent, in an abandoned building, in an overnight shelter, or couch-surfing.) No   Are you worried about losing your housing? No   Lack of transportation? No   Unable to get utilities (heat,electricity)? No   Want help with housing or utility concern? No   (!) HOUSING CONCERN PRESENT      1/7/2025   Dental   Dentist two times every year? (!) NO         1/7/2025   TB Screening   Were you born outside of the [place]? Yes         Today's PHQ-2 Score:       1/7/2025    11:09 AM   PHQ-2 ( 1999 [employer])   Q1: Little interest or pleasure in doing things 0   Q2: Feeling down, depressed or hopeless 0   PHQ-2 Score 0    Q1: Little interest or pleasure in doing things Not at all   Q2: Feeling down, depressed or hopeless Not at all   PHQ-2 Score 0       Patient-reported           1/7/2025   Substance Use   Alcohol more than 3/day or more than 7/wk No   Do you use any other substances recreationally? No     Social History     Tobacco Use    Smoking status: Never      "Passive exposure: Never    Smokeless tobacco: Never   Vaping Use    Vaping status: Never Used   Substance Use Topics    Alcohol use: No    Drug use: No           1/7/2025   STI Screening   New sexual partner(s) since last STI/HIV test? No         1/7/2025   Contraception/Family Planning   Questions about contraception or family planning No        Reviewed and updated as needed this visit by Provider                    Past Medical History:   Diagnosis Date    Anal fissure 01/07/2025     Past Surgical History:   Procedure Laterality Date    NO HISTORY OF SURGERY       OB History   No obstetric history on file.         Review of Systems  Constitutional, HEENT, cardiovascular, pulmonary, GI, , musculoskeletal, neuro, skin, endocrine and psych systems are negative, except as otherwise noted.     Objective    Exam  There were no vitals taken for this visit.   Estimated body mass index is 25.89 kg/m  as calculated from the following:    Height as of 9/19/24: 1.72 m (5' 7.72\").    Weight as of 9/19/24: 76.6 kg (168 lb 14.4 oz).    Physical Exam  GENERAL: alert and no distress  EYES: Eyes grossly normal to inspection, PERRL and conjunctivae and sclerae normal  HENT: ear canals and TM's normal, nose and mouth without ulcers or lesions  NECK: no adenopathy, no asymmetry, masses, or scars  RESP: lungs clear to auscultation - no rales, rhonchi or wheezes  CV: regular rate and rhythm, normal S1 S2, no S3 or S4, no murmur, click or rub, no peripheral edema  ABDOMEN: soft, nontender, no hepatosplenomegaly, no masses and bowel sounds normal  MS: no gross musculoskeletal defects noted, no edema  SKIN: no suspicious lesions or rashes  NEURO: Normal strength and tone, mentation intact and speech normal  PSYCH: mentation appears normal, affect normal/bright    Orders Placed This Encounter   Procedures    REVIEW OF HEALTH MAINTENANCE PROTOCOL ORDERS    Lipid panel reflex to direct LDL Non-fasting    Comprehensive metabolic panel (BMP " + Alb, Alk Phos, ALT, AST, Total. Bili, TP)    Vitamin D Deficiency         Signed Electronically by: Daisy Haley MD

## 2025-01-20 ENCOUNTER — MYC MEDICAL ADVICE (OUTPATIENT)
Dept: FAMILY MEDICINE | Facility: CLINIC | Age: 40
End: 2025-01-20
Payer: COMMERCIAL

## 2025-02-05 ENCOUNTER — E-VISIT (OUTPATIENT)
Dept: FAMILY MEDICINE | Facility: CLINIC | Age: 40
End: 2025-02-05
Payer: COMMERCIAL

## 2025-02-05 DIAGNOSIS — K21.9 GASTROESOPHAGEAL REFLUX DISEASE WITHOUT ESOPHAGITIS: Primary | ICD-10-CM

## 2025-02-05 NOTE — PATIENT INSTRUCTIONS
Diarrhea: Care Instructions  Overview     Diarrhea is loose, watery stools (bowel movements). The exact cause is often hard to find. Sometimes diarrhea is your body's way of getting rid of what caused an upset stomach. Viruses, food poisoning, and many medicines can cause diarrhea. Some people get diarrhea in response to emotional stress, anxiety, or certain foods.  Almost everyone has diarrhea now and then. It usually isn't serious, and your stools will return to normal soon. The important thing to do is replace the fluids you have lost, so you can prevent dehydration.  The doctor has checked you carefully, but problems can develop later. If you notice any problems or new symptoms, get medical treatment right away.  Follow-up care is a key part of your treatment and safety. Be sure to make and go to all appointments, and call your doctor if you are having problems. It's also a good idea to know your test results and keep a list of the medicines you take.  How can you care for yourself at home?  Watch for signs of dehydration, which means your body has lost too much water. Dehydration is a serious condition and should be treated right away. Signs of dehydration are:  Increasing thirst and dry eyes and mouth.  Feeling faint or lightheaded.  A smaller amount of urine than normal.  To prevent dehydration, drink plenty of fluids. Choose water and other clear liquids until you feel better. If you have kidney, heart, or liver disease and have to limit fluids, talk with your doctor before you increase the amount of fluids you drink.  When you feel like eating, start with small amounts of food.  The doctor may recommend that you take over-the-counter medicine, such as loperamide (Imodium). Read and follow all instructions on the label. Do not use this medicine if you have bloody diarrhea, a high fever, or other signs of serious illness. Call your doctor if you think you are having a problem with your medicine.  When should  "you call for help?   Call 911 anytime you think you may need emergency care. For example, call if:    You passed out (lost consciousness).     Your stools are maroon or very bloody.   Call your doctor now or seek immediate medical care if:    You are dizzy or lightheaded, or you feel like you may faint.     Your stools are black and look like tar, or they have streaks of blood.     You have new or worse belly pain.     You have symptoms of dehydration, such as:  Dry eyes and a dry mouth.  Passing only a little urine.  Cannot keep fluids down.     You have a new or higher fever.   Watch closely for changes in your health, and be sure to contact your doctor if:    Your diarrhea is getting worse.     You see pus in the diarrhea.     You are not getting better after 2 days (48 hours).   Where can you learn more?  Go to https://www.[employer].net/patiented  Enter W335 in the search box to learn more about \"Diarrhea: Care Instructions.\"  Current as of: October 19, 2023  Content Version: 14.3    2024 [employer].   Care instructions adapted under license by your healthcare professional. If you have questions about a medical condition or this instruction, always ask your healthcare professional. [employer] disclaims any warranty or liability for your use of this information.    "

## 2025-02-05 NOTE — TELEPHONE ENCOUNTER
Provider E-Visit time total (minutes): 5 minutes or less    Symptoms could be viral in nature.    Order stool H pylori.

## 2025-02-17 ENCOUNTER — TELEPHONE (OUTPATIENT)
Dept: FAMILY MEDICINE | Facility: CLINIC | Age: 40
End: 2025-02-17
Payer: COMMERCIAL

## 2025-02-17 DIAGNOSIS — A04.8 HELICOBACTER PYLORI INFECTION: Primary | ICD-10-CM

## 2025-02-17 LAB — H PYLORI AG STL QL IA: POSITIVE

## 2025-02-17 RX ORDER — OMEPRAZOLE 20 MG/1
20 CAPSULE, DELAYED RELEASE ORAL DAILY
Qty: 90 CAPSULE | Refills: 1 | Status: SHIPPED | OUTPATIENT
Start: 2025-02-17

## 2025-02-17 RX ORDER — CLARITHROMYCIN 500 MG/1
500 TABLET ORAL 2 TIMES DAILY
Qty: 20 TABLET | Refills: 0 | Status: SHIPPED | OUTPATIENT
Start: 2025-02-17

## 2025-02-17 RX ORDER — AMOXICILLIN 875 MG/1
875 TABLET, COATED ORAL 2 TIMES DAILY
Qty: 20 TABLET | Refills: 0 | Status: SHIPPED | OUTPATIENT
Start: 2025-02-17 | End: 2025-02-27

## 2025-02-17 NOTE — LETTER
February 20, 2025      George Morgan  [address]  [place] [address]        Dear George,         We have tried to attempted to reach you to relay your results.  Please call the clinic at [phone] and ask to speak with a care team.        Sincerely,  [facility]    
Unknown if ever smoked

## 2025-02-17 NOTE — TELEPHONE ENCOUNTER
Attempt #1 to call patient.     RN left voicemail and requested return call to [facility] at [phone].     DAMARIS Carpenter  [place] Triage RN  [facility]

## 2025-02-17 NOTE — TELEPHONE ENCOUNTER
----- Message from Daisy Haley sent at 2/17/2025 12:06 PM CST -----  Please call pt with results      Positive for H pylori results    Started, Omeprazole, one tab daily for 30 days, then as needed  Amoxicillin and clarithromycin bid for 10 days.    Thanks

## 2025-02-19 NOTE — TELEPHONE ENCOUNTER
Attempt #2 to call patient.     RN left voicemail and requested return call to [facility] at [phone].     DAMARIS Carpenter  [place] Triage RN  [facility]

## 2025-05-06 ENCOUNTER — OFFICE VISIT (OUTPATIENT)
Dept: FAMILY MEDICINE | Facility: CLINIC | Age: 40
End: 2025-05-06
Payer: COMMERCIAL

## 2025-05-06 VITALS
HEIGHT: 67 IN | DIASTOLIC BLOOD PRESSURE: 67 MMHG | WEIGHT: 170.8 LBS | RESPIRATION RATE: 16 BRPM | SYSTOLIC BLOOD PRESSURE: 102 MMHG | OXYGEN SATURATION: 99 % | HEART RATE: 56 BPM | BODY MASS INDEX: 26.81 KG/M2 | TEMPERATURE: 98.1 F

## 2025-05-06 DIAGNOSIS — A04.8 HELICOBACTER PYLORI INFECTION: ICD-10-CM

## 2025-05-06 DIAGNOSIS — K21.9 GASTROESOPHAGEAL REFLUX DISEASE WITHOUT ESOPHAGITIS: Primary | ICD-10-CM

## 2025-05-06 PROCEDURE — 3078F DIAST BP <80 MM HG: CPT | Performed by: FAMILY MEDICINE

## 2025-05-06 PROCEDURE — 99213 OFFICE O/P EST LOW 20 MIN: CPT | Performed by: FAMILY MEDICINE

## 2025-05-06 PROCEDURE — 1126F AMNT PAIN NOTED NONE PRSNT: CPT | Performed by: FAMILY MEDICINE

## 2025-05-06 PROCEDURE — 3074F SYST BP LT 130 MM HG: CPT | Performed by: FAMILY MEDICINE

## 2025-05-06 RX ORDER — OMEPRAZOLE 20 MG/1
20 CAPSULE, DELAYED RELEASE ORAL DAILY
Qty: 90 CAPSULE | Refills: 1 | Status: SHIPPED | OUTPATIENT
Start: 2025-05-06

## 2025-05-06 ASSESSMENT — PAIN SCALES - GENERAL: PAINLEVEL_OUTOF10: NO PAIN (0)

## 2025-05-06 NOTE — PROGRESS NOTES
"  Assessment & Plan     Gastroesophageal reflux disease without esophagitis  Diet modifications  Avoid spicy and acidic food.  - omeprazole (PRILOSEC) 20 MG DR capsule; Take 1 capsule (20 mg) by mouth daily.    Helicobacter pylori infection  Was treated over to months ago.  - Helicobacter pylori Antigen Stool; Future  - Helicobacter pylori Antigen Stool        BMI  Estimated body mass index is 26.75 kg/m  as calculated from the following:    Height as of this encounter: 1.702 m (5' 7\").    Weight as of this encounter: 77.5 kg (170 lb 12.8 oz).   Weight management plan: Discussed healthy diet and exercise guidelines      Follow-up       Maribel Vazquez is a 40 year old, presenting for the following health issues:    Patient with previous history of H. pylori, acid reflux.  He was treated back in February of this year.  He reports he continued to have fullness and pressure in his epigastric area.  He has changed his diet, he has been physically more active, eating more fiber.  He has no nausea no vomiting.    He has no constipation no diarrhea.      History of Present Illness       Reason for visit:  Breath and yawning    He eats 2-3 servings of fruits and vegetables daily.He consumes 2 sweetened beverage(s) daily.He exercises with enough effort to increase his heart rate 30 to 60 minutes per day.  He exercises with enough effort to increase his heart rate 5 days per week.   He is taking medications regularly.          Concern - can't take a deep, full breath  Onset: 4/6/25  Description: goes to take a deep breath and can't get a full breath of air, seems to happen more after eating  Intensity: mild, 0/10  Progression of Symptoms:  same and constant  Accompanying Signs & Symptoms: hard to get a full breath of air after eating especially  Previous history of similar problem: no  Precipitating factors:        Worsened by: when eating  Alleviating factors:        Improved by: 2-3 hours after eating feels " "better  Therapies tried and outcome: None        Review of Systems  Constitutional, HEENT, cardiovascular, pulmonary, GI, , musculoskeletal, neuro, skin, endocrine and psych systems are negative, except as otherwise noted.      Objective    /67 (BP Location: Right arm, Patient Position: Sitting, Cuff Size: Adult Regular)   Pulse 56   Temp 98.1  F (36.7  C) (Oral)   Resp 16   Ht 1.702 m (5' 7\")   Wt 77.5 kg (170 lb 12.8 oz)   SpO2 99%   BMI 26.75 kg/m    Body mass index is 26.75 kg/m .  Physical Exam   GENERAL: alert and no distress  NECK: no adenopathy, no asymmetry, masses, or scars  RESP: lungs clear to auscultation - no rales, rhonchi or wheezes  CV: regular rate and rhythm, normal S1 S2, no S3 or S4, no murmur, click or rub, no peripheral edema  ABDOMEN: soft, nontender, no hepatosplenomegaly, no masses and bowel sounds normal  MS: no gross musculoskeletal defects noted, no edema  PSYCH: mentation appears normal, affect normal/bright    Orders Placed This Encounter   Procedures    Helicobacter pylori Antigen Stool            Signed Electronically by: Daisy Haley MD    "

## 2025-05-09 ENCOUNTER — RESULTS FOLLOW-UP (OUTPATIENT)
Dept: FAMILY MEDICINE | Facility: CLINIC | Age: 40
End: 2025-05-09
Payer: COMMERCIAL

## 2025-05-09 NOTE — TELEPHONE ENCOUNTER
RN left  for patient requesting call back to clinic.    RN called to relay providers message.    Catrachito Oleary RN, BSN, PHN  [facility]

## 2025-05-09 NOTE — LETTER
May 15, 2025      George Morgan  [address] [place] [address]        Dear ,    We are writing to inform you of your test results.    We have tried to call you multiple times regarding a message from your provider, but we have been unable to reach you.     Please call us back at the clinic at [phone] and ask to speak with a nurse. Thank you    Resulted Orders   Helicobacter pylori Antigen Stool   Result Value Ref Range    Helicobacter pylori Antigen Stool Positive (A) Negative      Comment:      Positive for Helicobacter pylori antigen by enzyme immunoassay. A positive result indicates the presence of H. pylori antigen.       If you have any questions or concerns, please call the clinic at the number listed above.       Sincerely,      Your Care Team